# Patient Record
Sex: FEMALE | Race: WHITE | NOT HISPANIC OR LATINO | Employment: PART TIME | URBAN - METROPOLITAN AREA
[De-identification: names, ages, dates, MRNs, and addresses within clinical notes are randomized per-mention and may not be internally consistent; named-entity substitution may affect disease eponyms.]

---

## 2018-07-03 ENCOUNTER — HOSPITAL ENCOUNTER (INPATIENT)
Facility: HOSPITAL | Age: 53
LOS: 3 days | Discharge: HOME HEALTH | DRG: 872 | End: 2018-07-06
Attending: HOSPITALIST | Admitting: HOSPITALIST
Payer: COMMERCIAL

## 2018-07-03 DIAGNOSIS — E03.9 HYPOTHYROIDISM, ACQUIRED: ICD-10-CM

## 2018-07-03 DIAGNOSIS — R78.81 BACTEREMIA DUE TO GRAM-NEGATIVE BACTERIA: ICD-10-CM

## 2018-07-03 DIAGNOSIS — N20.0 NEPHROLITHIASIS: ICD-10-CM

## 2018-07-03 DIAGNOSIS — R31.9 URINARY TRACT INFECTION WITH HEMATURIA, SITE UNSPECIFIED: ICD-10-CM

## 2018-07-03 DIAGNOSIS — N13.4 HYDROURETER ON LEFT: ICD-10-CM

## 2018-07-03 DIAGNOSIS — R53.83 FATIGUE: ICD-10-CM

## 2018-07-03 DIAGNOSIS — N39.0 URINARY TRACT INFECTION WITH HEMATURIA, SITE UNSPECIFIED: ICD-10-CM

## 2018-07-03 DIAGNOSIS — N13.2 HYDRONEPHROSIS WITH URINARY OBSTRUCTION DUE TO URETERAL CALCULUS: ICD-10-CM

## 2018-07-03 DIAGNOSIS — N10 PYELONEPHRITIS, ACUTE: ICD-10-CM

## 2018-07-03 DIAGNOSIS — A41.51 SEPSIS DUE TO ESCHERICHIA COLI: Primary | ICD-10-CM

## 2018-07-03 PROBLEM — A41.9 SEPSIS: Status: ACTIVE | Noted: 2018-07-03

## 2018-07-03 LAB
ALBUMIN SERPL BCP-MCNC: 3.5 G/DL
ALP SERPL-CCNC: 93 U/L
ALT SERPL W/O P-5'-P-CCNC: 26 U/L
ANION GAP SERPL CALC-SCNC: 9 MMOL/L
AST SERPL-CCNC: 25 U/L
BACTERIA #/AREA URNS HPF: ABNORMAL /HPF
BASOPHILS # BLD AUTO: 0.03 K/UL
BASOPHILS NFR BLD: 0.3 %
BILIRUB SERPL-MCNC: 1 MG/DL
BILIRUB UR QL STRIP: NEGATIVE
BUN SERPL-MCNC: 13 MG/DL
CALCIUM SERPL-MCNC: 9.8 MG/DL
CHLORIDE SERPL-SCNC: 106 MMOL/L
CLARITY UR: ABNORMAL
CO2 SERPL-SCNC: 25 MMOL/L
COLOR UR: ABNORMAL
CREAT SERPL-MCNC: 1.1 MG/DL
DIFFERENTIAL METHOD: ABNORMAL
EOSINOPHIL # BLD AUTO: 0 K/UL
EOSINOPHIL NFR BLD: 0.3 %
ERYTHROCYTE [DISTWIDTH] IN BLOOD BY AUTOMATED COUNT: 12.1 %
EST. GFR  (AFRICAN AMERICAN): >60 ML/MIN/1.73 M^2
EST. GFR  (NON AFRICAN AMERICAN): 57 ML/MIN/1.73 M^2
GLUCOSE SERPL-MCNC: 112 MG/DL
GLUCOSE UR QL STRIP: NEGATIVE
HCT VFR BLD AUTO: 37.5 %
HGB BLD-MCNC: 12.7 G/DL
HGB UR QL STRIP: ABNORMAL
HYALINE CASTS #/AREA URNS LPF: 0 /LPF
KETONES UR QL STRIP: NEGATIVE
LACTATE SERPL-SCNC: 1.9 MMOL/L
LEUKOCYTE ESTERASE UR QL STRIP: ABNORMAL
LYMPHOCYTES # BLD AUTO: 0.7 K/UL
LYMPHOCYTES NFR BLD: 6.7 %
MCH RBC QN AUTO: 29.7 PG
MCHC RBC AUTO-ENTMCNC: 33.9 G/DL
MCV RBC AUTO: 88 FL
MICROSCOPIC COMMENT: ABNORMAL
MONOCYTES # BLD AUTO: 1.2 K/UL
MONOCYTES NFR BLD: 10.5 %
NEUTROPHILS # BLD AUTO: 9.1 K/UL
NEUTROPHILS NFR BLD: 82 %
NITRITE UR QL STRIP: POSITIVE
PH UR STRIP: 5 [PH] (ref 5–8)
PLATELET # BLD AUTO: 288 K/UL
PMV BLD AUTO: 9.6 FL
POTASSIUM SERPL-SCNC: 4 MMOL/L
PROT SERPL-MCNC: 7.3 G/DL
PROT UR QL STRIP: ABNORMAL
RBC # BLD AUTO: 4.28 M/UL
RBC #/AREA URNS HPF: 2 /HPF (ref 0–4)
SODIUM SERPL-SCNC: 140 MMOL/L
SP GR UR STRIP: 1.02 (ref 1–1.03)
URN SPEC COLLECT METH UR: ABNORMAL
UROBILINOGEN UR STRIP-ACNC: ABNORMAL EU/DL
WBC # BLD AUTO: 11.08 K/UL
WBC #/AREA URNS HPF: 60 /HPF (ref 0–5)

## 2018-07-03 PROCEDURE — 63600175 PHARM REV CODE 636 W HCPCS: Performed by: PHYSICIAN ASSISTANT

## 2018-07-03 PROCEDURE — 25000003 PHARM REV CODE 250: Performed by: PHYSICIAN ASSISTANT

## 2018-07-03 PROCEDURE — 87077 CULTURE AEROBIC IDENTIFY: CPT

## 2018-07-03 PROCEDURE — 96365 THER/PROPH/DIAG IV INF INIT: CPT

## 2018-07-03 PROCEDURE — 87086 URINE CULTURE/COLONY COUNT: CPT

## 2018-07-03 PROCEDURE — 11000001 HC ACUTE MED/SURG PRIVATE ROOM

## 2018-07-03 PROCEDURE — 87088 URINE BACTERIA CULTURE: CPT

## 2018-07-03 PROCEDURE — 25000003 PHARM REV CODE 250: Performed by: NURSE PRACTITIONER

## 2018-07-03 PROCEDURE — 83605 ASSAY OF LACTIC ACID: CPT

## 2018-07-03 PROCEDURE — 87186 SC STD MICRODIL/AGAR DIL: CPT | Mod: 59

## 2018-07-03 PROCEDURE — 87040 BLOOD CULTURE FOR BACTERIA: CPT | Mod: 59

## 2018-07-03 PROCEDURE — 85025 COMPLETE CBC W/AUTO DIFF WBC: CPT

## 2018-07-03 PROCEDURE — 80053 COMPREHEN METABOLIC PANEL: CPT

## 2018-07-03 PROCEDURE — 99285 EMERGENCY DEPT VISIT HI MDM: CPT | Mod: 25

## 2018-07-03 PROCEDURE — 63600175 PHARM REV CODE 636 W HCPCS: Performed by: NURSE PRACTITIONER

## 2018-07-03 PROCEDURE — 81000 URINALYSIS NONAUTO W/SCOPE: CPT

## 2018-07-03 PROCEDURE — 96375 TX/PRO/DX INJ NEW DRUG ADDON: CPT

## 2018-07-03 RX ORDER — FAMOTIDINE 10 MG/ML
20 INJECTION INTRAVENOUS EVERY 12 HOURS
Status: DISCONTINUED | OUTPATIENT
Start: 2018-07-03 | End: 2018-07-03

## 2018-07-03 RX ORDER — SODIUM CHLORIDE 9 MG/ML
INJECTION, SOLUTION INTRAVENOUS CONTINUOUS
Status: DISCONTINUED | OUTPATIENT
Start: 2018-07-03 | End: 2018-07-06 | Stop reason: HOSPADM

## 2018-07-03 RX ORDER — KETOROLAC TROMETHAMINE 30 MG/ML
15 INJECTION, SOLUTION INTRAMUSCULAR; INTRAVENOUS
Status: COMPLETED | OUTPATIENT
Start: 2018-07-03 | End: 2018-07-03

## 2018-07-03 RX ORDER — MORPHINE SULFATE 4 MG/ML
2 INJECTION, SOLUTION INTRAMUSCULAR; INTRAVENOUS EVERY 4 HOURS PRN
Status: DISCONTINUED | OUTPATIENT
Start: 2018-07-03 | End: 2018-07-06 | Stop reason: HOSPADM

## 2018-07-03 RX ORDER — ONDANSETRON 2 MG/ML
4 INJECTION INTRAMUSCULAR; INTRAVENOUS EVERY 8 HOURS PRN
Status: DISCONTINUED | OUTPATIENT
Start: 2018-07-03 | End: 2018-07-06 | Stop reason: HOSPADM

## 2018-07-03 RX ORDER — MORPHINE SULFATE 4 MG/ML
4 INJECTION, SOLUTION INTRAMUSCULAR; INTRAVENOUS EVERY 4 HOURS PRN
Status: DISCONTINUED | OUTPATIENT
Start: 2018-07-03 | End: 2018-07-06 | Stop reason: HOSPADM

## 2018-07-03 RX ORDER — PANTOPRAZOLE SODIUM 40 MG/1
40 TABLET, DELAYED RELEASE ORAL DAILY
Status: DISCONTINUED | OUTPATIENT
Start: 2018-07-04 | End: 2018-07-06 | Stop reason: HOSPADM

## 2018-07-03 RX ORDER — LEVOTHYROXINE SODIUM 150 UG/1
150 TABLET ORAL
Status: DISCONTINUED | OUTPATIENT
Start: 2018-07-04 | End: 2018-07-06 | Stop reason: HOSPADM

## 2018-07-03 RX ORDER — ONDANSETRON 2 MG/ML
4 INJECTION INTRAMUSCULAR; INTRAVENOUS
Status: COMPLETED | OUTPATIENT
Start: 2018-07-03 | End: 2018-07-03

## 2018-07-03 RX ORDER — ACETAMINOPHEN 325 MG/1
650 TABLET ORAL
Status: COMPLETED | OUTPATIENT
Start: 2018-07-03 | End: 2018-07-03

## 2018-07-03 RX ORDER — ERGOCALCIFEROL 1.25 MG/1
50000 CAPSULE ORAL WEEKLY
Status: DISCONTINUED | OUTPATIENT
Start: 2018-07-03 | End: 2018-07-06 | Stop reason: HOSPADM

## 2018-07-03 RX ORDER — ACETAMINOPHEN 325 MG/1
650 TABLET ORAL EVERY 8 HOURS PRN
Status: DISCONTINUED | OUTPATIENT
Start: 2018-07-03 | End: 2018-07-06 | Stop reason: HOSPADM

## 2018-07-03 RX ADMIN — ONDANSETRON 4 MG: 2 INJECTION INTRAMUSCULAR; INTRAVENOUS at 09:07

## 2018-07-03 RX ADMIN — ONDANSETRON 4 MG: 2 INJECTION INTRAMUSCULAR; INTRAVENOUS at 12:07

## 2018-07-03 RX ADMIN — CEFTRIAXONE 1 G: 1 INJECTION, SOLUTION INTRAVENOUS at 12:07

## 2018-07-03 RX ADMIN — ACETAMINOPHEN 650 MG: 325 TABLET, FILM COATED ORAL at 12:07

## 2018-07-03 RX ADMIN — MORPHINE SULFATE 4 MG: 4 INJECTION INTRAVENOUS at 09:07

## 2018-07-03 RX ADMIN — ACETAMINOPHEN 650 MG: 325 TABLET, FILM COATED ORAL at 07:07

## 2018-07-03 RX ADMIN — SODIUM CHLORIDE 1000 ML: 0.9 INJECTION, SOLUTION INTRAVENOUS at 11:07

## 2018-07-03 RX ADMIN — SODIUM CHLORIDE: 0.9 INJECTION, SOLUTION INTRAVENOUS at 05:07

## 2018-07-03 RX ADMIN — ERGOCALCIFEROL 50000 UNITS: 1.25 CAPSULE ORAL at 05:07

## 2018-07-03 RX ADMIN — KETOROLAC TROMETHAMINE 15 MG: 30 INJECTION, SOLUTION INTRAMUSCULAR at 12:07

## 2018-07-03 NOTE — SUBJECTIVE & OBJECTIVE
Past Medical History:   Diagnosis Date    Fibroids     uterine    Hernia, ventral     s/p cholectectomy    Hypothyroidism, acquired     Migraines     Weight gain        Past Surgical History:   Procedure Laterality Date    CARPAL TUNNEL RELEASE  may  2014    CHOLECYSTECTOMY      laparoscopic    meniscal surgery  May 2014    left knee    rotator cuff surgery      TUBAL LIGATION      bitateral       Review of patient's allergies indicates:   Allergen Reactions    Percocet  [oxycodone-acetaminophen]      Other reaction(s): Hives       No current facility-administered medications on file prior to encounter.      Current Outpatient Prescriptions on File Prior to Encounter   Medication Sig    ergocalciferol (ERGOCALCIFEROL) 50,000 unit Cap Take 1 capsule (50,000 Units total) by mouth once a week.    esomeprazole (NEXIUM) 40 MG capsule Take 1 capsule (40 mg total) by mouth before breakfast.    SYNTHROID 150 mcg tablet Take 1 tablet (150 mcg total) by mouth before breakfast.     Family History     Problem Relation (Age of Onset)    Breast cancer     Diabetes Mellitus Mother    Heart disease Father    Parkinsonism Father    Thyroid cancer Father        Social History Main Topics    Smoking status: Never Smoker    Smokeless tobacco: Never Used    Alcohol use No    Drug use: No    Sexual activity: Yes     Partners: Male     Review of Systems   Constitutional: Positive for appetite change, chills, fatigue and fever.   HENT: Negative for congestion and dental problem.    Eyes: Negative for discharge and itching.   Respiratory: Negative for apnea and chest tightness.    Cardiovascular: Negative for chest pain and leg swelling.   Gastrointestinal: Negative for abdominal distention and abdominal pain.   Endocrine: Negative for cold intolerance and heat intolerance.   Genitourinary: Positive for dysuria, flank pain and frequency. Negative for difficulty urinating and dyspareunia.   Musculoskeletal: Positive for  myalgias. Negative for arthralgias and back pain.   Skin: Negative for color change and pallor.   Allergic/Immunologic: Negative for environmental allergies and food allergies.   Neurological: Negative for dizziness and facial asymmetry.   Hematological: Negative for adenopathy. Does not bruise/bleed easily.   Psychiatric/Behavioral: Negative for agitation and behavioral problems.     Objective:     Vital Signs (Most Recent):  Temp: (!) 101.2 °F (38.4 °C) (07/03/18 1251)  Pulse: 93 (07/03/18 1251)  Resp: 18 (07/03/18 1105)  BP: (!) 111/58 (07/03/18 1251)  SpO2: (!) 93 % (07/03/18 1253) Vital Signs (24h Range):  Temp:  [101.2 °F (38.4 °C)-103.1 °F (39.5 °C)] 101.2 °F (38.4 °C)  Pulse:  [] 93  Resp:  [18] 18  SpO2:  [81 %-97 %] 93 %  BP: (111-120)/(57-58) 111/58     Weight: 77.1 kg (170 lb)  Body mass index is 28.29 kg/m².    Physical Exam   Constitutional: No distress.   HENT:   Head: Atraumatic.   Eyes: EOM are normal. Pupils are equal, round, and reactive to light.   Neck: Normal range of motion. Neck supple.   Cardiovascular: Regular rhythm.    tachycardia   Pulmonary/Chest: Effort normal and breath sounds normal.   Abdominal: Soft. Bowel sounds are normal. She exhibits no distension. There is no tenderness.   Musculoskeletal: Normal range of motion. She exhibits tenderness.   Left CVA tenderness    Skin: Skin is warm and dry.   Psychiatric: She has a normal mood and affect. Her behavior is normal.         CRANIAL NERVES     CN III, IV, VI   Pupils are equal, round, and reactive to light.  Extraocular motions are normal.        Significant Labs:   BMP:   Recent Labs  Lab 07/03/18  1145   *      K 4.0      CO2 25   BUN 13   CREATININE 1.1   CALCIUM 9.8     CBC:   Recent Labs  Lab 07/03/18  1145   WBC 11.08   HGB 12.7   HCT 37.5          Significant Imaging: reviewed.

## 2018-07-03 NOTE — H&P
"[facility] Hospital Medicine  History & Physical    Patient Name: Mirta Perry  MRN: [id number]  Admission Date: 7/3/2018  Attending Physician: Swapna Mena MD   Primary Care Provider: Primary Doctor No         Patient information was obtained from patient and ER records.     Subjective:     Principal Problem:Pyelonephritis, acute    Chief Complaint:   Chief Complaint   Patient presents with    Flank Pain     left flank pain started x 5 days ago getting worse.  + diarrhea and fever.  denies vomiting.  states had kidney stone and infection x 30 years ago.         HPI: 54 y/o female with uterine fibroids, hx of kidney stones, hypothyroidism,and hysterectomy presents to the ED c/o x5 day hx of L sided flank pain that now radiates to LLQ abdomen. The pain is severe (9/10) and worse with palpation and urination. She reports a "pulsating" pain with urination. The patient states "it feels like a kidney stone that I got 30 yrs ago." The patient also reports subjective fever, chills, nausea, diarrhea, and dysuria. The patient attempted AZO with slight relief. The patient reports hematuria x5 days ago that resolved after taking AZO. The patient denies emesis, vaginal d/c, or vaginal bleeding.patioent is septic with fever 103,tachycadia 136,source acute pyelonephritis,CT show 3 mm urethral obstruction with hydronephrosis and hydroureter,she has left CVA tenderness as well,cultures has been done and she has been started on IV Abx and IVF.    Past Medical History:   Diagnosis Date    Fibroids     uterine    Hernia, ventral     s/p cholectectomy    Hypothyroidism, acquired     Migraines     Weight gain        Past Surgical History:   Procedure Laterality Date    CARPAL TUNNEL RELEASE  may  2014    CHOLECYSTECTOMY      laparoscopic    meniscal surgery  May 2014    left knee    rotator cuff surgery      TUBAL LIGATION      bitateral       Review of patient's allergies indicates:   Allergen " Reactions    Percocet  [oxycodone-acetaminophen]      Other reaction(s): Hives       No current facility-administered medications on file prior to encounter.      Current Outpatient Prescriptions on File Prior to Encounter   Medication Sig    ergocalciferol (ERGOCALCIFEROL) 50,000 unit Cap Take 1 capsule (50,000 Units total) by mouth once a week.    esomeprazole (NEXIUM) 40 MG capsule Take 1 capsule (40 mg total) by mouth before breakfast.    SYNTHROID 150 mcg tablet Take 1 tablet (150 mcg total) by mouth before breakfast.     Family History     Problem Relation (Age of Onset)    Breast cancer     Diabetes Mellitus Mother    Heart disease Father    Parkinsonism Father    Thyroid cancer Father        Social History Main Topics    Smoking status: Never Smoker    Smokeless tobacco: Never Used    Alcohol use No    Drug use: No    Sexual activity: Yes     Partners: Male     Review of Systems   Constitutional: Positive for appetite change, chills, fatigue and fever.   HENT: Negative for congestion and dental problem.    Eyes: Negative for discharge and itching.   Respiratory: Negative for apnea and chest tightness.    Cardiovascular: Negative for chest pain and leg swelling.   Gastrointestinal: Negative for abdominal distention and abdominal pain.   Endocrine: Negative for cold intolerance and heat intolerance.   Genitourinary: Positive for dysuria, flank pain and frequency. Negative for difficulty urinating and dyspareunia.   Musculoskeletal: Positive for myalgias. Negative for arthralgias and back pain.   Skin: Negative for color change and pallor.   Allergic/Immunologic: Negative for environmental allergies and food allergies.   Neurological: Negative for dizziness and facial asymmetry.   Hematological: Negative for adenopathy. Does not bruise/bleed easily.   Psychiatric/Behavioral: Negative for agitation and behavioral problems.     Objective:     Vital Signs (Most Recent):  Temp: (!) 101.2 °F (38.4 °C)  (07/03/18 1251)  Pulse: 93 (07/03/18 1251)  Resp: 18 (07/03/18 1105)  BP: (!) 111/58 (07/03/18 1251)  SpO2: (!) 93 % (07/03/18 1253) Vital Signs (24h Range):  Temp:  [101.2 °F (38.4 °C)-103.1 °F (39.5 °C)] 101.2 °F (38.4 °C)  Pulse:  [] 93  Resp:  [18] 18  SpO2:  [81 %-97 %] 93 %  BP: (111-120)/(57-58) 111/58     Weight: 77.1 kg (170 lb)  Body mass index is 28.29 kg/m².    Physical Exam   Constitutional: No distress.   HENT:   Head: Atraumatic.   Eyes: EOM are normal. Pupils are equal, round, and reactive to light.   Neck: Normal range of motion. Neck supple.   Cardiovascular: Regular rhythm.    tachycardia   Pulmonary/Chest: Effort normal and breath sounds normal.   Abdominal: Soft. Bowel sounds are normal. She exhibits no distension. There is no tenderness.   Musculoskeletal: Normal range of motion. She exhibits tenderness.   Left CVA tenderness    Skin: Skin is warm and dry.   Psychiatric: She has a normal mood and affect. Her behavior is normal.         CRANIAL NERVES     CN III, IV, VI   Pupils are equal, round, and reactive to light.  Extraocular motions are normal.        Significant Labs:   BMP:   Recent Labs  Lab 07/03/18  1145   *      K 4.0      CO2 25   BUN 13   CREATININE 1.1   CALCIUM 9.8     CBC:   Recent Labs  Lab 07/03/18  1145   WBC 11.08   HGB 12.7   HCT 37.5          Significant Imaging: reviewed.    Assessment/Plan:     * Pyelonephritis, acute      Per clinical presentation with dysuria,CVA tenderness,positive  UA,will continue with IVF,IV Abx,follow cultures.        Hydroureter on left    Per CT ,Urology has been consulted.          Hydronephrosis with urinary obstruction due to ureteral calculus    Per CT rep[ort and clonical  Presentation,Uroogy has been consulted,keep NPO past MN.          Sepsis    With fever 103,tachycadia,source acute pyelonephritis,on IVF and  IV Abx,        Hypothyroidism, acquired    On synthroid.            VTE Risk Mitigation     None              Swapna Mena MD  Department of Hospital Medicine   [facility]

## 2018-07-03 NOTE — ASSESSMENT & PLAN NOTE
Per clinical presentation with dysuria,CVA tenderness,positive  UA,will continue with IVF,IV Abx,follow cultures.

## 2018-07-03 NOTE — ED PROVIDER NOTES
"Encounter Date: 7/3/2018    SCRIBE #1 NOTE: I, JacquelynIrisVanessa Croninang, am scribing for, and in the presence of,  Chang Simmons DNP. I have scribed the following portions of the note - Other sections scribed: HPI, ROS.       History     Chief Complaint   Patient presents with    Flank Pain     left flank pain started x 5 days ago getting worse.  + diarrhea and fever.  denies vomiting.  states had kidney stone and infection x 30 years ago.      CC: Flank Pain    52 y/o female with uterine fibroids, hx of kidney stones, and hysterectomy presents to the ED c/o x5 day hx of L sided flank pain that now radiates to LLQ abdomen. The pain is severe (9/10) and worse with palpation and urination. She reports a "pulsating" pain with urination. The patient states "it feels like a kidney stone that I got 30 yrs ago." The patient also reports subjective fever, chills, nausea, diarrhea, and dysuria. The patient attempted AZO with slight relief. The patient reports hematuria x5 days ago that resolved after taking AZO. The patient denies emesis, vaginal d/c, or vaginal bleeding. No other symptoms reported.       The history is provided by the patient. No  was used.     Review of patient's allergies indicates:   Allergen Reactions    Percocet  [oxycodone-acetaminophen]      Other reaction(s): Hives     Past Medical History:   Diagnosis Date    Fibroids     uterine    Hernia, ventral     s/p cholectectomy    Hypothyroidism, acquired     Migraines     Weight gain      Past Surgical History:   Procedure Laterality Date    CARPAL TUNNEL RELEASE  may  2014    CHOLECYSTECTOMY      laparoscopic    meniscal surgery  May 2014    left knee    rotator cuff surgery      TUBAL LIGATION      bitateral     Family History   Problem Relation Age of Onset    Diabetes Mellitus Mother     Thyroid cancer Father         daughter     Parkinsonism Father     Heart disease Father     Breast cancer Unknown         both sides "     Social History   Substance Use Topics    Smoking status: Never Smoker    Smokeless tobacco: Never Used    Alcohol use No     Review of Systems   Constitutional: Positive for chills and fever (subjective).   HENT: Negative for congestion, ear pain, rhinorrhea and sore throat.    Eyes: Negative for redness.   Respiratory: Negative for cough and shortness of breath.    Cardiovascular: Negative for chest pain.   Gastrointestinal: Positive for abdominal pain, diarrhea and nausea. Negative for vomiting.   Genitourinary: Positive for dysuria, flank pain (L sided radiates to LLQ abdomen) and hematuria (resolved). Negative for decreased urine volume, difficulty urinating, frequency, urgency, vaginal bleeding and vaginal discharge.   Musculoskeletal: Negative for back pain and neck pain.   Skin: Negative for rash.   Neurological: Negative for headaches.       Physical Exam     Initial Vitals [07/03/18 1105]   BP Pulse Resp Temp SpO2   (!) 120/57 (!) 127 18 (!) 103.1 °F (39.5 °C) 97 %      MAP       --         Physical Exam    Nursing note and vitals reviewed.  Constitutional: She appears well-developed and well-nourished.   HENT:   Head: Normocephalic and atraumatic.   Right Ear: External ear normal.   Left Ear: External ear normal.   Nose: Nose normal.   Eyes: Conjunctivae and EOM are normal. Pupils are equal, round, and reactive to light. Right eye exhibits no discharge. Left eye exhibits no discharge.   Neck: Normal range of motion.   Abdominal: Soft. Normal appearance and bowel sounds are normal. She exhibits no distension. There is no tenderness. There is CVA tenderness (left).   Musculoskeletal: Normal range of motion.   Neurological: She is alert and oriented to person, place, and time.   Skin: Skin is dry. Capillary refill takes less than 2 seconds.         ED Course   Procedures  Labs Reviewed   CBC W/ AUTO DIFFERENTIAL - Abnormal; Notable for the following:        Result Value    Gran # (ANC) 9.1 (*)      Lymph # 0.7 (*)     Mono # 1.2 (*)     Gran% 82.0 (*)     Lymph% 6.7 (*)     All other components within normal limits   COMPREHENSIVE METABOLIC PANEL - Abnormal; Notable for the following:     Glucose 112 (*)     eGFR if non  57 (*)     All other components within normal limits   URINALYSIS, REFLEX TO URINE CULTURE - Abnormal; Notable for the following:     Appearance, UA Cloudy (*)     Protein, UA 2+ (*)     Occult Blood UA 2+ (*)     Nitrite, UA Positive (*)     Urobilinogen, UA 2.0-3.0 (*)     Leukocytes, UA 3+ (*)     All other components within normal limits    Narrative:     Preferred Collection Type->Urine, Clean Catch   URINALYSIS MICROSCOPIC - Abnormal; Notable for the following:     WBC, UA 60 (*)     Bacteria, UA Moderate (*)     All other components within normal limits    Narrative:     Preferred Collection Type->Urine, Clean Catch   CULTURE, BLOOD   CULTURE, BLOOD   CULTURE, URINE   LACTIC ACID, PLASMA          Imaging Results          CT Renal Stone Study ABD Pelvis WO (Final result)  Result time 07/03/18 12:14:25    Final result by Macho Linares MD (07/03/18 12:14:25)                 Impression:      Findings suggestive of a 3 mm distal left ureteral calculus resulting in mild left-sided hydronephrosis and hydroureter.    Surgical changes.      Electronically signed by: Macho Linares MD  Date:    07/03/2018  Time:    12:14             Narrative:    EXAMINATION:  CT RENAL STONE STUDY ABD PELVIS WO    CLINICAL HISTORY:  Flank pain, stone disease suspected;    TECHNIQUE:  Low dose axial images, sagittal and coronal reformations were obtained from the lung bases to the pubic symphysis.  Contrast was not administered.    COMPARISON:  09/15/2009.    FINDINGS:  There are bilateral breast implants.  The lung bases are clear.  The bones appear intact without evidence for acute fracture or bone destruction.    The liver appears normal in size and is homogeneous in density with no focal  abnormalities of the liver identified.  The gallbladder is absent.  There are surgical changes involving the stomach.  The stomach otherwise appears unremarkable.  The spleen and pancreas both appear grossly unremarkable.  The adrenal glands are not enlarged.  The right kidney is normal in size, position, and contour and there is no evidence for abnormal renal masses or hydronephrosis on the right.  On the left, there is mild stranding of the perinephric fat.  There appears to be mild left-sided hydronephrosis and proximal hydroureter present.  The left ureter is difficult to follow throughout its course.  There is a 3 mm calcification within the pelvis which is not appreciated on the prior examination.  While this could represent a phlebolith, a distal left ureteral calculus is suspected.  Correlation with patient's clinical and physical findings is recommended.  There is evidence of prior abdominal hernia repair.  There are no dilated loops of bowel evident.  The urinary bladder is decompressed.  The uterus is absent.  No abnormal pelvic masses are identified.  There is no evidence for pelvic or inguinal lymphadenopathy.                                       APC / Resident Notes:   MEDICAL DECISION MAKING    INITIAL ASSESSMENT:  Left flank pain in a 53-year-old female for the last 5 days that radiates to the left groin and is worsening and constant.  She reports ibuprofen and Excedrin have been ineffective.  There is associated red colored urine.  Patient reports is made worse by eating.  She reports a current severity pain of 9/10 and associated fever with a T-max of 103.1°. While she was in nad, she was hemodynamically unstable with a hr of 137.  CVA was tender on left side.    ED COURSE: Following thorough history and physical, labs were drawn and pt submitted urine sample, underwent Ct scan of the abd pel stone protocol.    LABS AND RADIOLOGY: see below for lab and rad interpretations    MEDICATIONS  ADMINISTERED: zofran 4mg ivp, toradol 15mg ivp, NS 1L IV, tylenol 650mg po, rocephin 1g IVPB    DIFFERENTIAL DIAGNOSES:uti, pyelo, nephrolithiasis-obstructive and infected    ACUTE DIAGNOSIS (ES):nephrolithiasis, uti, sepsis    Pt will be admitted to the floor for IV abx and IV fluids.    PHYSICIAN INTERACTION:Care of the patient discussed with Dr. Schafer who agreed with the assessment and plan.          Scribe Attestation:   Scribe #1: I performed the above scribed service and the documentation accurately describes the services I performed. I attest to the accuracy of the note.    Attending Attestation:           Physician Attestation for Scribe:  Physician Attestation Statement for Scribe #1: I, Chang Simmons DNP, reviewed documentation, as scribed by Tosha Cardona in my presence, and it is both accurate and complete.                 ED Course as of Jul 03 1310   Tue Jul 03, 2018   1219 CBC: leukocyte count was normal, the H&H was normal. The platelet count was normal.       [VC]   1220 Findings suggestive of a 3 mm distal left ureteral calculus resulting in mild left-sided hydronephrosis and hydroureter.    Surgical changes. CT Renal Stone Study ABD Pelvis WO [VC]   1225 Lactate, Ramy: 1.9 [VC]   1236 The chemistry was negative for hypo-or hyper natremia, kalemia, chloridemia, or other electrolyte abnormalities; BUN and creatinine were within normal limits indicating normal kidney function, ALT and AST were within normal limits indicating normal liver function, there was no transaminitis.      [VC]   1256 BP: (!) 111/58 [VC]   1309 UA demonstrates infection with presence of nitrite and 60wbc/hpf.  [VC]   1310 Blood cultures and urine culture will be pending at time of disposition.  [VC]      ED Course User Index  [VC] Chang Santoyo DNP     Clinical Impression:   The primary encounter diagnosis was Nephrolithiasis. A diagnosis of Urinary tract infection with hematuria, site unspecified was also  pertinent to this visit.      Disposition:   Disposition: Admitted  Condition: Stable                        Chang Santoyo, MOIRA  07/03/18 1310

## 2018-07-03 NOTE — ED TRIAGE NOTES
Patient came in PER personal transport with c/o LEFT sided flank pain that started 5 days ago. Patient states that she is having diarrhea and fever, patient denies any vomiting. Patient has a history of kidney stone.

## 2018-07-03 NOTE — PROGRESS NOTES
Pt arrived to unit from ER with family @ side. Oriented to room. Bed in low position. Call light in reach, HOB elevated. SR up x2. AAox3. No distress noted. Will continue to monitor.

## 2018-07-03 NOTE — HPI
"52 y/o female with uterine fibroids, hx of kidney stones, hypothyroidism,and hysterectomy presents to the ED c/o x5 day hx of L sided flank pain that now radiates to LLQ abdomen. The pain is severe (9/10) and worse with palpation and urination. She reports a "pulsating" pain with urination. The patient states "it feels like a kidney stone that I got 30 yrs ago." The patient also reports subjective fever, chills, nausea, diarrhea, and dysuria. The patient attempted AZO with slight relief. The patient reports hematuria x5 days ago that resolved after taking AZO. The patient denies emesis, vaginal d/c, or vaginal bleeding.patioent is septic with fever 103,tachycadia 136,source acute pyelonephritis,CT show 3 mm urethral obstruction with hydronephrosis and hydroureter,she has left CVA tenderness as well,cultures has been done and she has been started on IV Abx and IVF.  "

## 2018-07-03 NOTE — PROGRESS NOTES
Pt sitting up in bed eating dinner with family @ side. No distress noted. Will continue to monitor. Pt request that TEDS/SCDS be put on later.

## 2018-07-04 PROBLEM — R78.81 BACTEREMIA DUE TO GRAM-NEGATIVE BACTERIA: Status: ACTIVE | Noted: 2018-07-04

## 2018-07-04 LAB
ALBUMIN SERPL BCP-MCNC: 2.8 G/DL
ALP SERPL-CCNC: 100 U/L
ALT SERPL W/O P-5'-P-CCNC: 35 U/L
ANION GAP SERPL CALC-SCNC: 7 MMOL/L
AST SERPL-CCNC: 41 U/L
BASOPHILS # BLD AUTO: 0.01 K/UL
BASOPHILS NFR BLD: 0.2 %
BILIRUB SERPL-MCNC: 0.7 MG/DL
BUN SERPL-MCNC: 12 MG/DL
CALCIUM SERPL-MCNC: 8.8 MG/DL
CHLORIDE SERPL-SCNC: 110 MMOL/L
CO2 SERPL-SCNC: 24 MMOL/L
CREAT SERPL-MCNC: 1 MG/DL
DIFFERENTIAL METHOD: ABNORMAL
EOSINOPHIL # BLD AUTO: 0 K/UL
EOSINOPHIL NFR BLD: 0.2 %
ERYTHROCYTE [DISTWIDTH] IN BLOOD BY AUTOMATED COUNT: 12.1 %
EST. GFR  (AFRICAN AMERICAN): >60 ML/MIN/1.73 M^2
EST. GFR  (NON AFRICAN AMERICAN): >60 ML/MIN/1.73 M^2
GLUCOSE SERPL-MCNC: 102 MG/DL
HCT VFR BLD AUTO: 33.3 %
HGB BLD-MCNC: 10.8 G/DL
LYMPHOCYTES # BLD AUTO: 0.7 K/UL
LYMPHOCYTES NFR BLD: 9.8 %
MCH RBC QN AUTO: 28.9 PG
MCHC RBC AUTO-ENTMCNC: 32.4 G/DL
MCV RBC AUTO: 89 FL
MONOCYTES # BLD AUTO: 0.7 K/UL
MONOCYTES NFR BLD: 11.2 %
NEUTROPHILS # BLD AUTO: 5.2 K/UL
NEUTROPHILS NFR BLD: 78.3 %
PLATELET # BLD AUTO: 269 K/UL
PMV BLD AUTO: 10 FL
POTASSIUM SERPL-SCNC: 3.9 MMOL/L
PROT SERPL-MCNC: 6.1 G/DL
RBC # BLD AUTO: 3.74 M/UL
SODIUM SERPL-SCNC: 141 MMOL/L
WBC # BLD AUTO: 6.62 K/UL

## 2018-07-04 PROCEDURE — 87040 BLOOD CULTURE FOR BACTERIA: CPT | Mod: 59

## 2018-07-04 PROCEDURE — 99252 IP/OBS CONSLTJ NEW/EST SF 35: CPT | Mod: ,,, | Performed by: UROLOGY

## 2018-07-04 PROCEDURE — 80053 COMPREHEN METABOLIC PANEL: CPT

## 2018-07-04 PROCEDURE — 63600175 PHARM REV CODE 636 W HCPCS: Performed by: NURSE PRACTITIONER

## 2018-07-04 PROCEDURE — 36415 COLL VENOUS BLD VENIPUNCTURE: CPT

## 2018-07-04 PROCEDURE — 25000003 PHARM REV CODE 250: Performed by: NURSE PRACTITIONER

## 2018-07-04 PROCEDURE — 25000003 PHARM REV CODE 250: Performed by: HOSPITALIST

## 2018-07-04 PROCEDURE — 85025 COMPLETE CBC W/AUTO DIFF WBC: CPT

## 2018-07-04 PROCEDURE — 11000001 HC ACUTE MED/SURG PRIVATE ROOM

## 2018-07-04 PROCEDURE — 63600175 PHARM REV CODE 636 W HCPCS: Performed by: HOSPITALIST

## 2018-07-04 RX ORDER — DIPHENHYDRAMINE HCL 25 MG
25 CAPSULE ORAL EVERY 6 HOURS PRN
Status: DISCONTINUED | OUTPATIENT
Start: 2018-07-04 | End: 2018-07-06 | Stop reason: HOSPADM

## 2018-07-04 RX ADMIN — LEVOTHYROXINE SODIUM 150 MCG: 150 TABLET ORAL at 07:07

## 2018-07-04 RX ADMIN — PANTOPRAZOLE SODIUM 40 MG: 40 TABLET, DELAYED RELEASE ORAL at 09:07

## 2018-07-04 RX ADMIN — ACETAMINOPHEN 650 MG: 325 TABLET, FILM COATED ORAL at 03:07

## 2018-07-04 RX ADMIN — SODIUM CHLORIDE 1000 ML: 0.9 INJECTION, SOLUTION INTRAVENOUS at 01:07

## 2018-07-04 RX ADMIN — DIPHENHYDRAMINE HYDROCHLORIDE 25 MG: 25 CAPSULE ORAL at 11:07

## 2018-07-04 RX ADMIN — CEFTRIAXONE 1 G: 1 INJECTION, SOLUTION INTRAVENOUS at 09:07

## 2018-07-04 RX ADMIN — SODIUM CHLORIDE: 0.9 INJECTION, SOLUTION INTRAVENOUS at 11:07

## 2018-07-04 RX ADMIN — ONDANSETRON 4 MG: 2 INJECTION INTRAMUSCULAR; INTRAVENOUS at 03:07

## 2018-07-04 RX ADMIN — SODIUM CHLORIDE: 0.9 INJECTION, SOLUTION INTRAVENOUS at 09:07

## 2018-07-04 RX ADMIN — CEFTRIAXONE 1 G: 1 INJECTION, SOLUTION INTRAVENOUS at 08:07

## 2018-07-04 NOTE — ASSESSMENT & PLAN NOTE
With fever 103,tachycadia,source acute pyelonephritis,on IVF and  IV Abx,fever is improved,but still is better.

## 2018-07-04 NOTE — SUBJECTIVE & OBJECTIVE
Past Medical History:   Diagnosis Date    Acute pyelonephritis 07/03/2018    Fatigue     Fibroids     uterine    Hernia, ventral     s/p cholectectomy    Hypothyroidism, acquired     Migraines     Vitamin D deficiency     Weight gain        Past Surgical History:   Procedure Laterality Date    APPENDECTOMY Bilateral     tummy tuck    BREAST SURGERY Bilateral     augmentation    CARPAL TUNNEL RELEASE Right 05/2014    CHOLECYSTECTOMY      laparoscopic    gastric sleeve      meniscal surgery  May 2014    left knee    rotator cuff surgery Right     TUBAL LIGATION      bitateral       Review of patient's allergies indicates:   Allergen Reactions    Percocet  [oxycodone-acetaminophen]      Other reaction(s): Hives       No current facility-administered medications on file prior to encounter.      Current Outpatient Prescriptions on File Prior to Encounter   Medication Sig    ergocalciferol (ERGOCALCIFEROL) 50,000 unit Cap Take 1 capsule (50,000 Units total) by mouth once a week.    esomeprazole (NEXIUM) 40 MG capsule Take 1 capsule (40 mg total) by mouth before breakfast.    SYNTHROID 150 mcg tablet Take 1 tablet (150 mcg total) by mouth before breakfast.     Family History     Problem Relation (Age of Onset)    Breast cancer     Diabetes Mellitus Mother    Heart disease Father    Parkinsonism Father    Thyroid cancer Father        Social History Main Topics    Smoking status: Never Smoker    Smokeless tobacco: Never Used    Alcohol use No    Drug use: No    Sexual activity: Yes     Partners: Male     Review of Systems   Constitutional: Positive for appetite change, chills, fatigue and fever.   HENT: Negative for congestion and dental problem.    Eyes: Negative for discharge and itching.   Respiratory: Negative for apnea and chest tightness.    Cardiovascular: Negative for chest pain and leg swelling.   Gastrointestinal: Negative for abdominal distention and abdominal pain.   Endocrine: Negative  for cold intolerance and heat intolerance.   Genitourinary: Positive for dysuria, flank pain and frequency. Negative for difficulty urinating and dyspareunia.   Musculoskeletal: Positive for myalgias. Negative for arthralgias and back pain.   Skin: Negative for color change and pallor.   Allergic/Immunologic: Negative for environmental allergies and food allergies.   Neurological: Negative for dizziness and facial asymmetry.   Hematological: Negative for adenopathy. Does not bruise/bleed easily.   Psychiatric/Behavioral: Negative for agitation and behavioral problems.     Objective:     Vital Signs (Most Recent):  Temp: 100 °F (37.8 °C) (07/04/18 0418)  Pulse: 86 (07/04/18 0418)  Resp: 17 (07/04/18 0418)  BP: (!) 101/58 (07/04/18 0418)  SpO2: (!) 94 % (07/04/18 0418) Vital Signs (24h Range):  Temp:  [98.4 °F (36.9 °C)-103.1 °F (39.5 °C)] 100 °F (37.8 °C)  Pulse:  [] 86  Resp:  [17-20] 17  SpO2:  [81 %-97 %] 94 %  BP: ()/(50-58) 101/58     Weight: 77.1 kg (170 lb)  Body mass index is 28.29 kg/m².    Physical Exam   Constitutional: No distress.   HENT:   Head: Atraumatic.   Eyes: EOM are normal. Pupils are equal, round, and reactive to light.   Neck: Normal range of motion. Neck supple.   Cardiovascular: Regular rhythm.    tachycardia   Pulmonary/Chest: Effort normal and breath sounds normal.   Abdominal: Soft. Bowel sounds are normal. She exhibits no distension. There is no tenderness.   Musculoskeletal: Normal range of motion. She exhibits tenderness.   Left CVA tenderness    Skin: Skin is warm and dry.   Psychiatric: She has a normal mood and affect. Her behavior is normal.         CRANIAL NERVES     CN III, IV, VI   Pupils are equal, round, and reactive to light.  Extraocular motions are normal.        Significant Labs:   BMP:     Recent Labs  Lab 07/04/18  0341         K 3.9      CO2 24   BUN 12   CREATININE 1.0   CALCIUM 8.8     CBC:     Recent Labs  Lab 07/03/18  1145  07/04/18  0341   WBC 11.08 6.62   HGB 12.7 10.8*   HCT 37.5 33.3*    269       Significant Imaging: reviewed.

## 2018-07-04 NOTE — CONSULTS
[facility]  Urology  Consult Note    Patient Name: Mirta Perry  MRN: [id number]  Admission Date: 7/3/2018  Hospital Length of Stay: 1   Code Status: Full Code   Attending Provider: Swapna Mena MD   Consulting Provider: Rashad Proctor MD  Primary Care Physician: Primary Doctor No  Principal Problem:Pyelonephritis, acute    Consults    Subjective:     HPI:  53wf  Left flank pain 7 days ago  Admitted with temp to 103 yesterday  Cultured and rocephin started  Ct showed 3mm left UVJ stone    Pain resolved overnight  No fever or chills this AM  No NV  Now afebrile and tachycardia has resolved  Pt now feels well although she has not seen the stone pass    Notified of consult at 812a today        Past Medical History:   Diagnosis Date    Acute pyelonephritis 07/03/2018    Fatigue     Fibroids     uterine    Hernia, ventral     s/p cholectectomy    Hypothyroidism, acquired     Migraines     Vitamin D deficiency     Weight gain        Past Surgical History:   Procedure Laterality Date    APPENDECTOMY Bilateral     tummy tuck    BREAST SURGERY Bilateral     augmentation    CARPAL TUNNEL RELEASE Right 05/2014    CHOLECYSTECTOMY      laparoscopic    gastric sleeve      meniscal surgery  May 2014    left knee    rotator cuff surgery Right     TUBAL LIGATION      bitateral       Review of patient's allergies indicates:   Allergen Reactions    Percocet  [oxycodone-acetaminophen]      Other reaction(s): Hives       Family History     Problem Relation (Age of Onset)    Breast cancer     Diabetes Mellitus Mother    Heart disease Father    Parkinsonism Father    Thyroid cancer Father          Social History Main Topics    Smoking status: Never Smoker    Smokeless tobacco: Never Used    Alcohol use No    Drug use: No    Sexual activity: Yes     Partners: Male       Review of Systems    Objective:     Temp:  [98.4 °F (36.9 °C)-103.1 °F (39.5 °C)] 99.1 °F (37.3 °C)  Pulse:  []  83  Resp:  [17-20] 17  SpO2:  [81 %-97 %] 92 %  BP: ()/(50-58) 95/50     Body mass index is 28.29 kg/m².            Drains          No matching active lines, drains, or airways          Physical Exam    Ao*4  resp normal rate' breathing not labored  cv normal rate  Ab nondistended  Ext no edema  No cva tenderness  Ab benign  Soft nontender  Pleasant and conversant; does not appear septic    Significant Labs:    BMP:    Recent Labs  Lab 07/03/18  1145 07/04/18  0341    141   K 4.0 3.9    110   CO2 25 24   BUN 13 12   CREATININE 1.1 1.0   CALCIUM 9.8 8.8       CBC:    Recent Labs  Lab 07/03/18  1145 07/04/18  0341   WBC 11.08 6.62   HGB 12.7 10.8*   HCT 37.5 33.3*    269       Blood Culture:   Recent Labs  Lab 07/03/18  1145 07/03/18  1153   LABBLOO No Growth to date Gram stain efren bottle: Gram negative rods   Results called to and read back by: Leigh Vargas in 4w 07/04/2018    02:01     Urine Culture: No results for input(s): LABURIN in the last 168 hours.    Significant Imaging:  CT: I have reviewed all results within the past 24 hours and my personal findings are:  3mm left UVJ stone with hydro                    Assessment and Plan:     Hydronephrosis with urinary obstruction due to ureteral calculus    Clinically it appears the stone has passed  No need for stent at this time  OK for pt to eat  Will check renal US  If there is persistent hydro consider CT urogram        Sepsis    Clinically appears much better  Cont IV abx per hosp med and monitor cultures            VTE Risk Mitigation         Ordered     IP VTE LOW RISK PATIENT  Once      07/03/18 1604     Place KELLY hose  Until discontinued      07/03/18 1604     Place sequential compression device  Until discontinued      07/03/18 1604          Thank you for your consult. [facility] will follow-up with patient. Please contact us if you have any additional questions.    Rashad Proctor MD  Urology  [facility]

## 2018-07-04 NOTE — HOSPITAL COURSE
Ms. Perry was admitted with sepsis due to acute left pyelonephritis. Workup with CT with stone protocol showed 3 mm urethral obstruction with hydronephrosis and hydroureter and she had left CVA tenderness. Pt was treated empirically with IVF, Rocephin and urine and blood cultures were obtained. Urology was consulted and she underwent further evaluation with retroperitoneal U/S which showed resolution of left sided hydronephrosis and left CVA pain/tenderness resolved. Urine and blood culture was remarkable for E.coli which was pan sensitive. Repeat blood culture were obtained on 7/4/2018 which were NGTD. Treatment options (antibiotics) were discussed with the patient, and initially the plan was to convert to Cipro but patient reported severe reaction with N/V/diarrhea with prolonged symptoms, therefore, we discussed treatment with IV Rocephin with PICC line placement which she was agreeable to since there was no other viable PO antibiotic option due to bacteremia. Home health was arranged on discharge for the patient to complete 14 day course total from date of negative blood culture with Rocephin. Pt arranged for follow up with PCP and Urology.    Antibiotic Regimen:  Rocephin (ceftriaxone) 1 g IV daily   Last dose due on July 18, 2018

## 2018-07-04 NOTE — PLAN OF CARE
Problem: Patient Care Overview  Goal: Plan of Care Review   07/04/18 0337   Coping/Psychosocial   Plan Of Care Reviewed With patient;spouse   Patient resting in bed with  Eyes closed. Resp. Even non-labored no acute distress noted. Able to make needs known. Denies pain discomfort at present time. Patient continues on IV ABX therapy for pyelonephritis. No adverse reactions noted. Pain is being managed with  Current pain medication regimen morphine. Safety maintained, bed locked and in lowest position with  Call light in reach.

## 2018-07-04 NOTE — ASSESSMENT & PLAN NOTE
Per CT rep[ort and clonical  Presentation,Uroogy has been consulted,keep NPO at this time,doubt require intervention.

## 2018-07-04 NOTE — ASSESSMENT & PLAN NOTE
Source possibly acute pyelonephritis,will continue with IV Abx,follow identification,repeat blood culture today.

## 2018-07-04 NOTE — PROGRESS NOTES
"[facility]  Progress Note    Patient Name: Mirta Perry  MRN: [id number]  Patient Class: IP- Inpatient   Admission Date: 7/3/2018  Length of Stay: 1 days  Attending Physician: Swapna Mena MD  Primary Care Provider: Primary Doctor No        Subjective:     Principal Problem:Pyelonephritis, acute    HPI:  54 y/o female with uterine fibroids, hx of kidney stones, hypothyroidism,and hysterectomy presents to the ED c/o x5 day hx of L sided flank pain that now radiates to LLQ abdomen. The pain is severe (9/10) and worse with palpation and urination. She reports a "pulsating" pain with urination. The patient states "it feels like a kidney stone that I got 30 yrs ago." The patient also reports subjective fever, chills, nausea, diarrhea, and dysuria. The patient attempted AZO with slight relief. The patient reports hematuria x5 days ago that resolved after taking AZO. The patient denies emesis, vaginal d/c, or vaginal bleeding.patioent is septic with fever 103,tachycadia 136,source acute pyelonephritis,CT show 3 mm urethral obstruction with hydronephrosis and hydroureter,she has left CVA tenderness as well,cultures has been done and she has been started on IV Abx and IVF.    Hospital Course:  54 y/o female with uterine fibroids, hx of kidney stones, hypothyroidism,and hysterectomy presents to the ED c/o x5 day hx of L sided flank pain that now radiates to LLQ abdomen. The pain is severe (9/10) and worse with palpation and urination. She reports a "pulsating" pain with urination. The patient states "it feels like a kidney stone that I got 30 yrs ago." The patient also reports subjective fever, chills, nausea, diarrhea, and dysuria. The patient attempted AZO with slight relief. The patient reports hematuria x5 days ago that resolved after taking AZO. The patient denies emesis, vaginal d/c, or vaginal bleeding.patioent is septic with fever 103,tachycadia 136,source acute left " pyelonephritis,CT show 3 mm urethral obstruction with hydronephrosis and hydroureter,she has left CVA tenderness as well,cultures has been done and she has been started on IV Abx and IVF.  Patient has GNR on blood culture on 7.3.18,source acute pyelonephritis,repeat blood culture today.  Urology has been consulted.    Past Medical History:   Diagnosis Date    Acute pyelonephritis 07/03/2018    Fatigue     Fibroids     uterine    Hernia, ventral     s/p cholectectomy    Hypothyroidism, acquired     Migraines     Vitamin D deficiency     Weight gain        Past Surgical History:   Procedure Laterality Date    APPENDECTOMY Bilateral     tummy tuck    BREAST SURGERY Bilateral     augmentation    CARPAL TUNNEL RELEASE Right 05/2014    CHOLECYSTECTOMY      laparoscopic    gastric sleeve      meniscal surgery  May 2014    left knee    rotator cuff surgery Right     TUBAL LIGATION      bitateral       Review of patient's allergies indicates:   Allergen Reactions    Percocet  [oxycodone-acetaminophen]      Other reaction(s): Hives       No current facility-administered medications on file prior to encounter.      Current Outpatient Prescriptions on File Prior to Encounter   Medication Sig    ergocalciferol (ERGOCALCIFEROL) 50,000 unit Cap Take 1 capsule (50,000 Units total) by mouth once a week.    esomeprazole (NEXIUM) 40 MG capsule Take 1 capsule (40 mg total) by mouth before breakfast.    SYNTHROID 150 mcg tablet Take 1 tablet (150 mcg total) by mouth before breakfast.     Family History     Problem Relation (Age of Onset)    Breast cancer     Diabetes Mellitus Mother    Heart disease Father    Parkinsonism Father    Thyroid cancer Father        Social History Main Topics    Smoking status: Never Smoker    Smokeless tobacco: Never Used    Alcohol use No    Drug use: No    Sexual activity: Yes     Partners: Male     Review of Systems   Constitutional: Positive for appetite change, chills, fatigue  and fever.   HENT: Negative for congestion and dental problem.    Eyes: Negative for discharge and itching.   Respiratory: Negative for apnea and chest tightness.    Cardiovascular: Negative for chest pain and leg swelling.   Gastrointestinal: Negative for abdominal distention and abdominal pain.   Endocrine: Negative for cold intolerance and heat intolerance.   Genitourinary: Positive for dysuria, flank pain and frequency. Negative for difficulty urinating and dyspareunia.   Musculoskeletal: Positive for myalgias. Negative for arthralgias and back pain.   Skin: Negative for color change and pallor.   Allergic/Immunologic: Negative for environmental allergies and food allergies.   Neurological: Negative for dizziness and facial asymmetry.   Hematological: Negative for adenopathy. Does not bruise/bleed easily.   Psychiatric/Behavioral: Negative for agitation and behavioral problems.     Objective:     Vital Signs (Most Recent):  Temp: 100 °F (37.8 °C) (07/04/18 0418)  Pulse: 86 (07/04/18 0418)  Resp: 17 (07/04/18 0418)  BP: (!) 101/58 (07/04/18 0418)  SpO2: (!) 94 % (07/04/18 0418) Vital Signs (24h Range):  Temp:  [98.4 °F (36.9 °C)-103.1 °F (39.5 °C)] 100 °F (37.8 °C)  Pulse:  [] 86  Resp:  [17-20] 17  SpO2:  [81 %-97 %] 94 %  BP: ()/(50-58) 101/58     Weight: 77.1 kg (170 lb)  Body mass index is 28.29 kg/m².    Physical Exam   Constitutional: No distress.   HENT:   Head: Atraumatic.   Eyes: EOM are normal. Pupils are equal, round, and reactive to light.   Neck: Normal range of motion. Neck supple.   Cardiovascular: Regular rhythm.    tachycardia   Pulmonary/Chest: Effort normal and breath sounds normal.   Abdominal: Soft. Bowel sounds are normal. She exhibits no distension. There is no tenderness.   Musculoskeletal: Normal range of motion. She exhibits tenderness.   Left CVA tenderness    Skin: Skin is warm and dry.   Psychiatric: She has a normal mood and affect. Her behavior is normal.         CRANIAL  NERVES     CN III, IV, VI   Pupils are equal, round, and reactive to light.  Extraocular motions are normal.        Significant Labs:   BMP:     Recent Labs  Lab 07/04/18  0341         K 3.9      CO2 24   BUN 12   CREATININE 1.0   CALCIUM 8.8     CBC:     Recent Labs  Lab 07/03/18  1145 07/04/18  0341   WBC 11.08 6.62   HGB 12.7 10.8*   HCT 37.5 33.3*    269       Significant Imaging: reviewed.    Assessment/Plan:      * Pyelonephritis, acute      Per clinical presentation with dysuria,CVA tenderness,positive  UA,will continue with IVF,IV Abx,follow cultures.        Hydroureter on left    Per CT ,Urology has been consulted.          Hydronephrosis with urinary obstruction due to ureteral calculus    Per CT rep[ort and clonical  Presentation,Uroogy has been consulted,keep NPO at this time,doubt require intervention.          Sepsis    With fever 103,tachycadia,source acute pyelonephritis,on IVF and  IV Abx,fever is improved,but still is better.        Hypothyroidism, acquired    On synthroid.          Bacteremia due to Gram-negative bacteria    Source possibly acute pyelonephritis,will continue with IV Abx,follow identification,repeat blood culture today.            VTE Risk Mitigation         Ordered     IP VTE LOW RISK PATIENT  Once      07/03/18 1604     Place KELLY hose  Until discontinued      07/03/18 1604     Place sequential compression device  Until discontinued      07/03/18 1604              Swapna Mena MD  Department of Hospital Medicine   [facility]

## 2018-07-04 NOTE — PLAN OF CARE
"SW met with patient to complete discharge needs assessment. SW reviewed with patient contents of "[facility]" including "help at home", "things patient responsible for to manage her health at home" and "preferences". Patient was able to verbalize her help at home is her spouse and adult daughter Naomie. SW discussed with patient how she will manage his health at home is by going on her doctor appointments, getting prescriptions filled, and taking medications as prescribed. SW wrote name and phone number on white communication board. Patient prefers to schedule her own PCP follow-up appointment, however prefers morning doctor appointments if with a specialist.         [facility] [address]  [address]  [place] [address]  Phone: [phone] Fax: [phone]           07/04/18 1550   Discharge Assessment   Assessment Type Discharge Planning Assessment   Confirmed/corrected address and phone number on facesheet? Yes   Assessment information obtained from? Patient   Communicated expected length of stay with patient/caregiver no   Prior to hospitilization cognitive status: Alert/Oriented;No Deficits   Prior to hospitalization functional status: Independent   Current cognitive status: Alert/Oriented;No Deficits   Current Functional Status: Independent   Facility Arrived From: Home   Lives With spouse;child(diann), adult   Able to Return to Prior Arrangements yes   Is patient able to care for self after discharge? Yes   Who are your caregiver(s) and their phone number(s)? Reji Aviles (spouse) [phone]   Patient's perception of discharge disposition home or selfcare   Readmission Within The Last 30 Days no previous admission in last 30 days   Patient currently being followed by outpatient case management? No   Patient currently receives any other outside agency services? No   Equipment Currently Used at Home none   Do you have any problems affording any of your prescribed " medications? No   Is the patient taking medications as prescribed? yes   Does the patient have transportation home? Yes   Transportation Available car;family or friend will provide   Does the patient receive services at the [facility]? No   Discharge Plan A Home with family  (PCP F/U)   Discharge Plan B Home with family   Patient/Family In Agreement With Plan yes   Does the patient have transportation to healthcare appointments? Yes

## 2018-07-04 NOTE — ASSESSMENT & PLAN NOTE
Clinically it appears the stone has passed  No need for stent at this time  OK for pt to eat  Will check renal US  If there is persistent hydro consider CT urogram

## 2018-07-04 NOTE — HPI
53wf  Left flank pain 7 days ago  Admitted with temp to 103 yesterday  Cultured and rocephin started  Ct showed 3mm left UVJ stone    Pain resolved overnight  No fever or chills this AM  No NV  Now afebrile and tachycardia has resolved  Pt now feels well although she has not seen the stone pass    Notified of consult at 812a today

## 2018-07-04 NOTE — PLAN OF CARE
Problem: Patient Care Overview  Goal: Plan of Care Review  Outcome: Ongoing (interventions implemented as appropriate)  Patient is A/Ox4, remains free from falls, is afebrile, states no pain.  Patient tolerating diet well.  Ambulates to restroom independently, voids spontaneously, states no pain during urination.

## 2018-07-04 NOTE — SUBJECTIVE & OBJECTIVE
Past Medical History:   Diagnosis Date    Acute pyelonephritis 07/03/2018    Fatigue     Fibroids     uterine    Hernia, ventral     s/p cholectectomy    Hypothyroidism, acquired     Migraines     Vitamin D deficiency     Weight gain        Past Surgical History:   Procedure Laterality Date    APPENDECTOMY Bilateral     tummy tuck    BREAST SURGERY Bilateral     augmentation    CARPAL TUNNEL RELEASE Right 05/2014    CHOLECYSTECTOMY      laparoscopic    gastric sleeve      meniscal surgery  May 2014    left knee    rotator cuff surgery Right     TUBAL LIGATION      bitateral       Review of patient's allergies indicates:   Allergen Reactions    Percocet  [oxycodone-acetaminophen]      Other reaction(s): Hives       Family History     Problem Relation (Age of Onset)    Breast cancer     Diabetes Mellitus Mother    Heart disease Father    Parkinsonism Father    Thyroid cancer Father          Social History Main Topics    Smoking status: Never Smoker    Smokeless tobacco: Never Used    Alcohol use No    Drug use: No    Sexual activity: Yes     Partners: Male       Review of Systems    Objective:     Temp:  [98.4 °F (36.9 °C)-103.1 °F (39.5 °C)] 99.1 °F (37.3 °C)  Pulse:  [] 83  Resp:  [17-20] 17  SpO2:  [81 %-97 %] 92 %  BP: ()/(50-58) 95/50     Body mass index is 28.29 kg/m².            Drains          No matching active lines, drains, or airways          Physical Exam    Ao*4  resp normal rate' breathing not labored  cv normal rate  Ab nondistended  Ext no edema  No cva tenderness  Ab benign  Soft nontender  Pleasant and conversant; does not appear septic    Significant Labs:    BMP:    Recent Labs  Lab 07/03/18  1145 07/04/18  0341    141   K 4.0 3.9    110   CO2 25 24   BUN 13 12   CREATININE 1.1 1.0   CALCIUM 9.8 8.8       CBC:    Recent Labs  Lab 07/03/18  1145 07/04/18  0341   WBC 11.08 6.62   HGB 12.7 10.8*   HCT 37.5 33.3*    269       Blood Culture:   Recent  Labs  Lab 07/03/18  1145 07/03/18  1153   LABBLOO No Growth to date Gram stain efren bottle: Gram negative rods   Results called to and read back by: Leigh Vargas in 4w 07/04/2018    02:01     Urine Culture: No results for input(s): LABURIN in the last 168 hours.    Significant Imaging:  CT: I have reviewed all results within the past 24 hours and my personal findings are:  3mm left UVJ stone with hydro

## 2018-07-05 LAB
ALBUMIN SERPL BCP-MCNC: 2.5 G/DL
ALP SERPL-CCNC: 92 U/L
ALT SERPL W/O P-5'-P-CCNC: 30 U/L
ANION GAP SERPL CALC-SCNC: 8 MMOL/L
AST SERPL-CCNC: 32 U/L
BACTERIA BLD CULT: NORMAL
BACTERIA UR CULT: NORMAL
BASOPHILS # BLD AUTO: 0.03 K/UL
BASOPHILS NFR BLD: 0.6 %
BILIRUB SERPL-MCNC: 0.3 MG/DL
BUN SERPL-MCNC: 10 MG/DL
CALCIUM SERPL-MCNC: 8.7 MG/DL
CHLORIDE SERPL-SCNC: 112 MMOL/L
CO2 SERPL-SCNC: 22 MMOL/L
CREAT SERPL-MCNC: 0.9 MG/DL
DIFFERENTIAL METHOD: ABNORMAL
EOSINOPHIL # BLD AUTO: 0.1 K/UL
EOSINOPHIL NFR BLD: 1.5 %
ERYTHROCYTE [DISTWIDTH] IN BLOOD BY AUTOMATED COUNT: 12 %
EST. GFR  (AFRICAN AMERICAN): >60 ML/MIN/1.73 M^2
EST. GFR  (NON AFRICAN AMERICAN): >60 ML/MIN/1.73 M^2
GLUCOSE SERPL-MCNC: 91 MG/DL
HCT VFR BLD AUTO: 29.6 %
HGB BLD-MCNC: 9.8 G/DL
LYMPHOCYTES # BLD AUTO: 1 K/UL
LYMPHOCYTES NFR BLD: 21 %
MCH RBC QN AUTO: 29.3 PG
MCHC RBC AUTO-ENTMCNC: 33.1 G/DL
MCV RBC AUTO: 89 FL
MONOCYTES # BLD AUTO: 0.8 K/UL
MONOCYTES NFR BLD: 16.8 %
NEUTROPHILS # BLD AUTO: 2.9 K/UL
NEUTROPHILS NFR BLD: 59.9 %
PLATELET # BLD AUTO: 236 K/UL
PMV BLD AUTO: 10 FL
POTASSIUM SERPL-SCNC: 3.9 MMOL/L
PROT SERPL-MCNC: 5.6 G/DL
RBC # BLD AUTO: 3.34 M/UL
SODIUM SERPL-SCNC: 142 MMOL/L
WBC # BLD AUTO: 4.77 K/UL

## 2018-07-05 PROCEDURE — 63600175 PHARM REV CODE 636 W HCPCS: Performed by: HOSPITALIST

## 2018-07-05 PROCEDURE — 99232 SBSQ HOSP IP/OBS MODERATE 35: CPT | Mod: ,,, | Performed by: UROLOGY

## 2018-07-05 PROCEDURE — 25000003 PHARM REV CODE 250: Performed by: NURSE PRACTITIONER

## 2018-07-05 PROCEDURE — 80053 COMPREHEN METABOLIC PANEL: CPT

## 2018-07-05 PROCEDURE — 36415 COLL VENOUS BLD VENIPUNCTURE: CPT

## 2018-07-05 PROCEDURE — 63600175 PHARM REV CODE 636 W HCPCS: Performed by: NURSE PRACTITIONER

## 2018-07-05 PROCEDURE — 11000001 HC ACUTE MED/SURG PRIVATE ROOM

## 2018-07-05 PROCEDURE — 85025 COMPLETE CBC W/AUTO DIFF WBC: CPT

## 2018-07-05 PROCEDURE — 94761 N-INVAS EAR/PLS OXIMETRY MLT: CPT

## 2018-07-05 RX ADMIN — ACETAMINOPHEN 650 MG: 325 TABLET, FILM COATED ORAL at 01:07

## 2018-07-05 RX ADMIN — ACETAMINOPHEN 650 MG: 325 TABLET, FILM COATED ORAL at 11:07

## 2018-07-05 RX ADMIN — SODIUM CHLORIDE: 0.9 INJECTION, SOLUTION INTRAVENOUS at 08:07

## 2018-07-05 RX ADMIN — CEFTRIAXONE 1 G: 1 INJECTION, SOLUTION INTRAVENOUS at 09:07

## 2018-07-05 RX ADMIN — CEFTRIAXONE 1 G: 1 INJECTION, SOLUTION INTRAVENOUS at 08:07

## 2018-07-05 RX ADMIN — ONDANSETRON 4 MG: 2 INJECTION INTRAMUSCULAR; INTRAVENOUS at 09:07

## 2018-07-05 RX ADMIN — LEVOTHYROXINE SODIUM 150 MCG: 150 TABLET ORAL at 06:07

## 2018-07-05 RX ADMIN — PANTOPRAZOLE SODIUM 40 MG: 40 TABLET, DELAYED RELEASE ORAL at 08:07

## 2018-07-05 NOTE — PLAN OF CARE
Problem: Patient Care Overview  Goal: Plan of Care Review  Outcome: Ongoing (interventions implemented as appropriate)   07/05/18 0536   Coping/Psychosocial   Plan Of Care Reviewed With patient   Pt is AAOx4. Pt remains free from falls. Pt ambulates to bathroom. IV remains patent. NS @ 125. Pt denies pain. Pt c/o about rash on the lower back. Pt states the rash is new. MD notified. Benadryl given as ordered. Safety maintained: bed in lowest position with wheels locked, personal items and call light within reach, SRx2. No distress noted, will continue to monitor.

## 2018-07-05 NOTE — CONSULTS
[facility]  Urology  Consult Note    Patient Name: Mirta Perry  MRN: [id number]  Admission Date: 7/3/2018  Hospital Length of Stay: 2   Code Status: Full Code   Attending Provider: Ludivina Benitez MD   Consulting Provider: Susanne Nicolas MD  Primary Care Physician: Cherie Del Toro MD  Principal Problem:Pyelonephritis, acute    Inpatient consult to Urology  Consult performed by: SUSANNE NICOLAS  Consult ordered by: KERI ADAME          Subjective:     HPI:  53wf  Left flank pain 7 days ago  Admitted with temp to 103 yesterday  Cultured and rocephin started  Ct showed 3mm left UVJ stone    Pain resolved overnight  No fever or chills this AM  No NV  Now afebrile and tachycardia has resolved  Pt now feels well although she has not seen the stone pass    Notified of consult at 812a today    Interval History: LG fever overnight with associated chills. No further flank pain. Denies dysuria.    Past Medical History:   Diagnosis Date    Acute pyelonephritis 07/03/2018    Fatigue     Fibroids     uterine    Hernia, ventral     s/p cholectectomy    Hypothyroidism, acquired     Migraines     Vitamin D deficiency     Weight gain        Past Surgical History:   Procedure Laterality Date    APPENDECTOMY Bilateral     tummy tuck    BREAST SURGERY Bilateral     augmentation    CARPAL TUNNEL RELEASE Right 05/2014    CHOLECYSTECTOMY      laparoscopic    gastric sleeve      meniscal surgery  May 2014    left knee    rotator cuff surgery Right     TUBAL LIGATION      bitateral       Review of patient's allergies indicates:   Allergen Reactions    Percocet  [oxycodone-acetaminophen]      Other reaction(s): Hives       Family History     Problem Relation (Age of Onset)    Breast cancer     Diabetes Mellitus Mother    Heart disease Father    Parkinsonism Father    Thyroid cancer Father          Social History Main Topics    Smoking status: Never Smoker    Smokeless  tobacco: Never Used    Alcohol use No    Drug use: No    Sexual activity: Yes     Partners: Male       Review of Systems   Constitutional: Positive for fever. Negative for appetite change and chills.   HENT: Negative for congestion, sore throat and trouble swallowing.    Eyes: Negative for pain and itching.   Respiratory: Negative for cough and shortness of breath.    Cardiovascular: Negative for chest pain, palpitations and leg swelling.   Gastrointestinal: Negative for abdominal distention, abdominal pain, constipation, diarrhea, nausea and vomiting.   Genitourinary: Negative for difficulty urinating, dysuria, flank pain, hematuria, nocturia and urgency.   Musculoskeletal: Negative for back pain, neck pain and neck stiffness.   Skin: Negative for rash and wound.   Neurological: Negative for dizziness and seizures.   Hematological: Negative for adenopathy. Does not bruise/bleed easily.   Psychiatric/Behavioral: Negative for confusion. The patient is not nervous/anxious.    All other systems reviewed and are negative.      Objective:     Temp:  [98.4 °F (36.9 °C)-100 °F (37.8 °C)] 98.6 °F (37 °C)  Pulse:  [60-91] 65  Resp:  [17-18] 17  SpO2:  [94 %-98 %] 98 %  BP: ()/(48-56) 102/56     Body mass index is 28.29 kg/m².      Date 07/05/18 0700 - 07/06/18 0659   Shift 5078-7409 5035-1907 9521-8210 24 Hour Total   I  N  T  A  K  E   I.V.  (mL/kg) 1500  (19.5)   1500  (19.5)    IV Piggyback 50   50    Shift Total  (mL/kg) 1550  (20.1)   1550  (20.1)   O  U  T  P  U  T   Shift Total  (mL/kg)       Weight (kg) 77.1 77.1 77.1 77.1          Drains          No matching active lines, drains, or airways          Physical Exam   Vitals reviewed.  Constitutional: She is oriented to person, place, and time. She appears well-developed and well-nourished. No distress.   HENT:   Head: Normocephalic and atraumatic.   Neck: Normal range of motion.   Cardiovascular: Normal rate and regular rhythm.    Pulmonary/Chest: Effort  normal and breath sounds normal. No respiratory distress.   Abdominal: Soft. She exhibits no distension and no mass. There is no tenderness. There is no rebound and no guarding.   Musculoskeletal: Normal range of motion.   Neurological: She is alert and oriented to person, place, and time.   Skin: Skin is warm and dry. No rash noted. She is not diaphoretic. No erythema.     Psychiatric: She has a normal mood and affect. Her behavior is normal.       Significant Labs:    BMP:    Recent Labs  Lab 07/03/18  1145 07/04/18  0341 07/05/18  0427    141 142   K 4.0 3.9 3.9    110 112*   CO2 25 24 22*   BUN 13 12 10   CREATININE 1.1 1.0 0.9   CALCIUM 9.8 8.8 8.7       CBC:    Recent Labs  Lab 07/03/18  1145 07/04/18  0341 07/05/18  0427   WBC 11.08 6.62 4.77   HGB 12.7 10.8* 9.8*   HCT 37.5 33.3* 29.6*    269 236       Blood Culture:   Recent Labs  Lab 07/03/18  1145 07/03/18  1153 07/04/18  0723   LABBLOO No Growth to date  No Growth to date Gram stain efren bottle: Gram negative rods   Results called to and read back by: Leigh Vargas in 4w 07/04/2018    02:01  ESCHERICHIA COLI No Growth to date  No Growth to date     Urine Culture:   Recent Labs  Lab 07/03/18  1118   LABURIN ESCHERICHIA COLI>100,000 cfu/ml     Urine Studies:   Recent Labs  Lab 07/03/18  1118   COLORU Odalis   APPEARANCEUA Cloudy*   PHUR 5.0   SPECGRAV 1.020   PROTEINUA 2+*   GLUCUA Negative   KETONESU Negative   BILIRUBINUA Negative   OCCULTUA 2+*   NITRITE Positive*   UROBILINOGEN 2.0-3.0*   LEUKOCYTESUR 3+*   RBCUA 2   WBCUA 60*   BACTERIA Moderate*   HYALINECASTS 0       Significant Imaging:  All pertinent imaging results/findings from the past 24 hours have been reviewed.                Assessment and Plan:     * Pyelonephritis, acute     - Continue abx        Hydronephrosis with urinary obstruction due to ureteral calculus    Clinically it appears the stone has passed  No need for stent at this time  CLAU - no further  hydronephrosis  Consider CT urogram if pain recurs        Sepsis    Clinically appears much better  Cont abx per hosp med and monitor cultures            VTE Risk Mitigation         Ordered     IP VTE LOW RISK PATIENT  Once      07/03/18 1604     Place KELLY hose  Until discontinued      07/03/18 1604     Place sequential compression device  Until discontinued      07/03/18 1604          Thank you for your consult. I will follow-up with patient. Please contact us if you have any additional questions.    Susanne Nicolas MD  Urology  [facility]

## 2018-07-05 NOTE — SUBJECTIVE & OBJECTIVE
Interval History: LG fever overnight with associated chills. No further flank pain. Denies dysuria.    Past Medical History:   Diagnosis Date    Acute pyelonephritis 07/03/2018    Fatigue     Fibroids     uterine    Hernia, ventral     s/p cholectectomy    Hypothyroidism, acquired     Migraines     Vitamin D deficiency     Weight gain        Past Surgical History:   Procedure Laterality Date    APPENDECTOMY Bilateral     tummy tuck    BREAST SURGERY Bilateral     augmentation    CARPAL TUNNEL RELEASE Right 05/2014    CHOLECYSTECTOMY      laparoscopic    gastric sleeve      meniscal surgery  May 2014    left knee    rotator cuff surgery Right     TUBAL LIGATION      bitateral       Review of patient's allergies indicates:   Allergen Reactions    Percocet  [oxycodone-acetaminophen]      Other reaction(s): Hives       Family History     Problem Relation (Age of Onset)    Breast cancer     Diabetes Mellitus Mother    Heart disease Father    Parkinsonism Father    Thyroid cancer Father          Social History Main Topics    Smoking status: Never Smoker    Smokeless tobacco: Never Used    Alcohol use No    Drug use: No    Sexual activity: Yes     Partners: Male       Review of Systems   Constitutional: Positive for fever. Negative for appetite change and chills.   HENT: Negative for congestion, sore throat and trouble swallowing.    Eyes: Negative for pain and itching.   Respiratory: Negative for cough and shortness of breath.    Cardiovascular: Negative for chest pain, palpitations and leg swelling.   Gastrointestinal: Negative for abdominal distention, abdominal pain, constipation, diarrhea, nausea and vomiting.   Genitourinary: Negative for difficulty urinating, dysuria, flank pain, hematuria, nocturia and urgency.   Musculoskeletal: Negative for back pain, neck pain and neck stiffness.   Skin: Negative for rash and wound.   Neurological: Negative for dizziness and seizures.   Hematological:  Negative for adenopathy. Does not bruise/bleed easily.   Psychiatric/Behavioral: Negative for confusion. The patient is not nervous/anxious.    All other systems reviewed and are negative.      Objective:     Temp:  [98.4 °F (36.9 °C)-100 °F (37.8 °C)] 98.6 °F (37 °C)  Pulse:  [60-91] 65  Resp:  [17-18] 17  SpO2:  [94 %-98 %] 98 %  BP: ()/(48-56) 102/56     Body mass index is 28.29 kg/m².      Date 07/05/18 0700 - 07/06/18 0659   Shift 8189-7948 2428-0659 8923-1004 24 Hour Total   I  N  T  A  K  E   I.V.  (mL/kg) 1500  (19.5)   1500  (19.5)    IV Piggyback 50   50    Shift Total  (mL/kg) 1550  (20.1)   1550  (20.1)   O  U  T  P  U  T   Shift Total  (mL/kg)       Weight (kg) 77.1 77.1 77.1 77.1          Drains          No matching active lines, drains, or airways          Physical Exam   Vitals reviewed.  Constitutional: She is oriented to person, place, and time. She appears well-developed and well-nourished. No distress.   HENT:   Head: Normocephalic and atraumatic.   Neck: Normal range of motion.   Cardiovascular: Normal rate and regular rhythm.    Pulmonary/Chest: Effort normal and breath sounds normal. No respiratory distress.   Abdominal: Soft. She exhibits no distension and no mass. There is no tenderness. There is no rebound and no guarding.   Musculoskeletal: Normal range of motion.   Neurological: She is alert and oriented to person, place, and time.   Skin: Skin is warm and dry. No rash noted. She is not diaphoretic. No erythema.     Psychiatric: She has a normal mood and affect. Her behavior is normal.       Significant Labs:    BMP:    Recent Labs  Lab 07/03/18  1145 07/04/18  0341 07/05/18  0427    141 142   K 4.0 3.9 3.9    110 112*   CO2 25 24 22*   BUN 13 12 10   CREATININE 1.1 1.0 0.9   CALCIUM 9.8 8.8 8.7       CBC:    Recent Labs  Lab 07/03/18  1145 07/04/18  0341 07/05/18  0427   WBC 11.08 6.62 4.77   HGB 12.7 10.8* 9.8*   HCT 37.5 33.3* 29.6*    269 236       Blood  Culture:   Recent Labs  Lab 07/03/18  1145 07/03/18  1153 07/04/18  0723   LABBLOO No Growth to date  No Growth to date Gram stain efren bottle: Gram negative rods   Results called to and read back by: Leigh Vargas in 4w 07/04/2018    02:01  ESCHERICHIA COLI No Growth to date  No Growth to date     Urine Culture:   Recent Labs  Lab 07/03/18  1118   LABURIN ESCHERICHIA COLI>100,000 cfu/ml     Urine Studies:   Recent Labs  Lab 07/03/18  1118   COLORU Odalis   APPEARANCEUA Cloudy*   PHUR 5.0   SPECGRAV 1.020   PROTEINUA 2+*   GLUCUA Negative   KETONESU Negative   BILIRUBINUA Negative   OCCULTUA 2+*   NITRITE Positive*   UROBILINOGEN 2.0-3.0*   LEUKOCYTESUR 3+*   RBCUA 2   WBCUA 60*   BACTERIA Moderate*   HYALINECASTS 0       Significant Imaging:  All pertinent imaging results/findings from the past 24 hours have been reviewed.

## 2018-07-05 NOTE — PLAN OF CARE
Problem: Patient Care Overview  Goal: Plan of Care Review  Outcome: Ongoing (interventions implemented as appropriate)  Patient is A/Ox4, remains free from falls, has remained afebrile this shift, states no pain.  Patient voiding spontaneously.  Tolerating IV fluids and diet well.

## 2018-07-05 NOTE — ASSESSMENT & PLAN NOTE
Clinically it appears the stone has passed  No need for stent at this time  CLAU - no further hydronephrosis  Consider CT urogram if pain recurs

## 2018-07-06 VITALS
RESPIRATION RATE: 18 BRPM | HEIGHT: 65 IN | OXYGEN SATURATION: 97 % | TEMPERATURE: 98 F | WEIGHT: 170 LBS | SYSTOLIC BLOOD PRESSURE: 104 MMHG | HEART RATE: 61 BPM | BODY MASS INDEX: 28.32 KG/M2 | DIASTOLIC BLOOD PRESSURE: 58 MMHG

## 2018-07-06 PROBLEM — R78.81 BACTEREMIA DUE TO GRAM-NEGATIVE BACTERIA: Status: RESOLVED | Noted: 2018-07-04 | Resolved: 2018-07-06

## 2018-07-06 PROBLEM — N10 PYELONEPHRITIS, ACUTE: Status: RESOLVED | Noted: 2018-07-03 | Resolved: 2018-07-06

## 2018-07-06 PROBLEM — A41.9 SEPSIS: Status: RESOLVED | Noted: 2018-07-03 | Resolved: 2018-07-06

## 2018-07-06 PROBLEM — N13.2 HYDRONEPHROSIS WITH URINARY OBSTRUCTION DUE TO URETERAL CALCULUS: Status: RESOLVED | Noted: 2018-07-03 | Resolved: 2018-07-06

## 2018-07-06 PROBLEM — N13.4 HYDROURETER ON LEFT: Status: RESOLVED | Noted: 2018-07-03 | Resolved: 2018-07-06

## 2018-07-06 LAB
ALBUMIN SERPL BCP-MCNC: 2.8 G/DL
ALP SERPL-CCNC: 111 U/L
ALT SERPL W/O P-5'-P-CCNC: 33 U/L
ANION GAP SERPL CALC-SCNC: 10 MMOL/L
AST SERPL-CCNC: 31 U/L
BASOPHILS # BLD AUTO: 0.03 K/UL
BASOPHILS NFR BLD: 0.6 %
BILIRUB SERPL-MCNC: 0.3 MG/DL
BUN SERPL-MCNC: 10 MG/DL
CALCIUM SERPL-MCNC: 9.2 MG/DL
CHLORIDE SERPL-SCNC: 113 MMOL/L
CO2 SERPL-SCNC: 22 MMOL/L
CREAT SERPL-MCNC: 0.8 MG/DL
DIFFERENTIAL METHOD: ABNORMAL
EOSINOPHIL # BLD AUTO: 0.1 K/UL
EOSINOPHIL NFR BLD: 2.6 %
ERYTHROCYTE [DISTWIDTH] IN BLOOD BY AUTOMATED COUNT: 12.2 %
EST. GFR  (AFRICAN AMERICAN): >60 ML/MIN/1.73 M^2
EST. GFR  (NON AFRICAN AMERICAN): >60 ML/MIN/1.73 M^2
GLUCOSE SERPL-MCNC: 93 MG/DL
HCT VFR BLD AUTO: 30.7 %
HGB BLD-MCNC: 9.9 G/DL
LYMPHOCYTES # BLD AUTO: 1.3 K/UL
LYMPHOCYTES NFR BLD: 23.8 %
MCH RBC QN AUTO: 28.8 PG
MCHC RBC AUTO-ENTMCNC: 32.2 G/DL
MCV RBC AUTO: 89 FL
MONOCYTES # BLD AUTO: 0.7 K/UL
MONOCYTES NFR BLD: 12.5 %
NEUTROPHILS # BLD AUTO: 3.3 K/UL
NEUTROPHILS NFR BLD: 60.3 %
PLATELET # BLD AUTO: 237 K/UL
PMV BLD AUTO: 10.1 FL
POTASSIUM SERPL-SCNC: 4.4 MMOL/L
PROT SERPL-MCNC: 6.1 G/DL
RBC # BLD AUTO: 3.44 M/UL
SODIUM SERPL-SCNC: 145 MMOL/L
WBC # BLD AUTO: 5.42 K/UL

## 2018-07-06 PROCEDURE — 80053 COMPREHEN METABOLIC PANEL: CPT

## 2018-07-06 PROCEDURE — 36415 COLL VENOUS BLD VENIPUNCTURE: CPT

## 2018-07-06 PROCEDURE — 63600175 PHARM REV CODE 636 W HCPCS: Performed by: HOSPITALIST

## 2018-07-06 PROCEDURE — 02HV33Z INSERTION OF INFUSION DEVICE INTO SUPERIOR VENA CAVA, PERCUTANEOUS APPROACH: ICD-10-PCS | Performed by: HOSPITALIST

## 2018-07-06 PROCEDURE — 36569 INSJ PICC 5 YR+ W/O IMAGING: CPT

## 2018-07-06 PROCEDURE — 25000003 PHARM REV CODE 250: Performed by: NURSE PRACTITIONER

## 2018-07-06 PROCEDURE — 99232 SBSQ HOSP IP/OBS MODERATE 35: CPT | Mod: ,,, | Performed by: UROLOGY

## 2018-07-06 PROCEDURE — 63600175 PHARM REV CODE 636 W HCPCS: Performed by: NURSE PRACTITIONER

## 2018-07-06 PROCEDURE — 85025 COMPLETE CBC W/AUTO DIFF WBC: CPT

## 2018-07-06 RX ORDER — ONDANSETRON HYDROCHLORIDE 8 MG/1
8 TABLET, FILM COATED ORAL EVERY 8 HOURS PRN
Qty: 30 TABLET | Refills: 0 | Status: SHIPPED | OUTPATIENT
Start: 2018-07-06 | End: 2019-07-09

## 2018-07-06 RX ORDER — ACETAMINOPHEN 325 MG/1
650 TABLET ORAL EVERY 8 HOURS PRN
Refills: 0 | COMMUNITY
Start: 2018-07-06 | End: 2019-07-09

## 2018-07-06 RX ADMIN — ONDANSETRON 4 MG: 2 INJECTION INTRAMUSCULAR; INTRAVENOUS at 06:07

## 2018-07-06 RX ADMIN — CEFTRIAXONE 1 G: 1 INJECTION, SOLUTION INTRAVENOUS at 06:07

## 2018-07-06 RX ADMIN — LEVOTHYROXINE SODIUM 150 MCG: 150 TABLET ORAL at 06:07

## 2018-07-06 RX ADMIN — PANTOPRAZOLE SODIUM 40 MG: 40 TABLET, DELAYED RELEASE ORAL at 09:07

## 2018-07-06 NOTE — PROGRESS NOTES
Vee, RN with [facility] contacted EDGARDO to report a nurse on the way to hospital to provide teaching for IV abx. Vee reported her office currently running insurance to verify benefits. EDGARDO uploaded clinicals to [facility] because [facility] did not accept patient, unable to meet needs. EDGARDO spoke to Ger that reported office working on verifying insurance benefits.

## 2018-07-06 NOTE — PROGRESS NOTES
Follow-up Information     Cherie Del Toro MD. Schedule an appointment as soon as possible for a visit in 1 week.    Specialty:  Internal Medicine  Why:  Outpatient Services, PCP Follow-up Appointment  Contact information:  [address]  [address]  [place] [address]  [phone]             Susanne Nicolas MD. Go on 7/20/2018.    Specialty:  Urology  Why:  Outpatient Services, Urology Follow-up Appointment, Please arrive to clinic for 9:00AM, You will see DARRYN Ferro  Contact information:  [address]  [address]  [place] [address]  [phone]             [facility].    Specialties:  Pharmacist, DME Provider, IV Infusion  Why:  Infusion Provider (Home IV Abx)  Contact information:  [address] [place] [address]  [phone]             [facility]    Specialty:  Home Health Services  Why:  Home Health Provider  Contact information:  [address] [facility] [address]  [address]  [place] [address]  [phone]                   [facility]    WRITTEN HEALTHCARE AND DISCHARGE INFORMATION                        Help at Home           [phone]  After discharge for assistance [facility] On Call Nurse Care Line 24/7  Assistance    Things You are responsible For To Manage Your Care At Home:  1.    Getting your prescriptions filled   2.    Taking your medications as directed, DO NOT MISS ANY DOSES!  3.    Going to your follow-up doctor appointment. This is important because it  allow the doctor to monitor your progress and determine if  any changes need to made to your treatment plan.     Thank you for choosing [facility] for your care.  Please answer any calls you may receive from [facility] we want to continue to support you as you manage your healthcare needs. [facility] is happy to have the opportunity to serve you.     Sincerely,  Your [facility] Team,  Bailey Cardoza LMSW   II  [phone]

## 2018-07-06 NOTE — ASSESSMENT & PLAN NOTE
- Source due to acute pyelonephritis  - Continue Rocephin  - Repeat blood culture NGTD  - As discussed above

## 2018-07-06 NOTE — PLAN OF CARE
[facility]    HOME HEALTH ORDERS  FACE TO FACE ENCOUNTER    Patient Name: Mirta Perry  YOB: 1965    PCP: Cherie Del Toro MD   PCP Address: [address] [place] [address] / [facility] [address]  PCP Phone Number: [phone]  PCP Fax: [phone]    Encounter Date: 07/06/2018    Admit to Home Health    Diagnoses:  Active Hospital Problems    Diagnosis  POA    Hypothyroidism, acquired [E03.9]  Yes      Resolved Hospital Problems    Diagnosis Date Resolved POA    *Pyelonephritis, acute [N10] 07/06/2018 Yes     Priority: 1 - High    Sepsis [A41.9] 07/06/2018 Yes     Priority: 2     Hydronephrosis with urinary obstruction due to ureteral calculus [N13.2] 07/06/2018 Yes     Priority: 3     Hydroureter on left [N13.4] 07/06/2018 Yes     Priority: 4     Bacteremia due to Gram-negative bacteria [R78.81] 07/06/2018 Yes     Priority: 5        No future appointments.  Follow-up Information     Cherie Del Toro MD In 1 week.    Specialty:  Internal Medicine  Contact information:  [address] [place] [id number] [phone]                     I have seen and examined this patient face to face today. My clinical findings that support the need for the home health skilled services and home bound status are the following:  Weakness/numbness causing balance and gait disturbance due to Infection making it taxing to leave home.    Allergies:  Review of patient's allergies indicates:   Allergen Reactions    Ciprofloxacin     Percocet  [oxycodone-acetaminophen]      Other reaction(s): Hives       Diet: regular diet    Activities: activity as tolerated    Nursing:   SN to complete comprehensive assessment including routine vital signs. Instruct on disease process and s/s of complications to report to MD. Review/verify medication list sent home with the patient at time of discharge  and instruct patient/caregiver as needed. Frequency may be  adjusted depending on start of care date.    Notify MD if SBP > 160 or < 90; DBP > 90 or < 50; HR > 120 or < 50; Temp > 101.      MISCELLANEOUS CARE:  Home Infusion Therapy:   SN to perform Infusion Therapy/Central Line Care.  Review Central Line Care & Central Line Flush with patient.    Administer (drug and dose): Rocephin (ceftriaxone) 1 g IV daily   Last dose given: 7/6/2018                       Home dose due: 7/7/2018  Last dose due on July 18, 2018    Scrub the Hub: Prior to accessing the line, always perform a 30 second alcohol scrub  Each lumen of the central line is to be flushed at least daily with 10 mL Normal Saline and 3 mL Heparin flush (100 units/mL)  Skilled Nurse (SN) may draw blood from IV access  Blood Draw Procedure:   - Aspirate at least 5 mL of blood   - Discard   - Obtain specimen   - Change posiflow cap   - Flush with 20 mL Normal Saline followed by a                 3-5 mL Heparin flush (100 units/mL)  Central :   - Sterile dressing changes are done weekly and as needed.   - Use chlor-hexadine scrub to cleanse site, apply Biopatch to insertion site,       apply securement device dressing   - Posi-flow caps are changed weekly and after EVERY lab draw.   - If sterile gauze is under dressing to control oozing,                 dressing change must be performed every 24 hours until gauze is not needed.  Discontinue PICC line after last dose given on July 18, 2018        Medications: Review discharge medications with patient and family and provide education.      Current Discharge Medication List      START taking these medications    Details   acetaminophen (TYLENOL) 325 MG tablet Take 2 tablets (650 mg total) by mouth every 8 (eight) hours as needed for Temperature greater than (or equal to 101 degree F).  Refills: 0      cefTRIAXone 1 g in dextrose 5 % 50 mL (ROCEPHIN) 1 g/50 mL PgBk IVPB Inject 50 mLs (1 g total) into the vein once daily. for 12 days  Qty: 12 each, Refills:  0    Last dose on July 18, 2018      ondansetron (ZOFRAN) 8 MG tablet Take 1 tablet (8 mg total) by mouth every 8 (eight) hours as needed for Nausea.  Qty: 30 tablet, Refills: 0         CONTINUE these medications which have NOT CHANGED    Details   ergocalciferol (ERGOCALCIFEROL) 50,000 unit Cap Take 1 capsule (50,000 Units total) by mouth once a week.  Qty: 4 capsule, Refills: 6      esomeprazole (NEXIUM) 40 MG capsule Take 1 capsule (40 mg total) by mouth before breakfast.  Qty: 30 capsule, Refills: 6      SYNTHROID 150 mcg tablet Take 1 tablet (150 mcg total) by mouth before breakfast.  Qty: 30 tablet, Refills: 6             I certify that this patient is confined to her home and needs intermittent skilled nursing care.

## 2018-07-06 NOTE — ASSESSMENT & PLAN NOTE
- Met criteria for sepsis with bacteremia and pyelonephritis  - Resolved with treatment  - Follow up repeat blood culture

## 2018-07-06 NOTE — SUBJECTIVE & OBJECTIVE
Interval History: She feels much better.  She would like to go home.    Review of Systems   Constitutional: Negative.  Negative for fever.   HENT: Negative.    Eyes: Negative.    Respiratory: Negative for cough, chest tightness and shortness of breath.    Cardiovascular: Negative for chest pain.   Gastrointestinal: Negative.  Negative for constipation, diarrhea and nausea.   Genitourinary: Negative for dysuria and flank pain.   Musculoskeletal: Negative.    Neurological: Negative.    Psychiatric/Behavioral: Negative.      Objective:     Temp:  [98.2 °F (36.8 °C)-98.7 °F (37.1 °C)] 98.3 °F (36.8 °C)  Pulse:  [57-71] 66  Resp:  [17-18] 18  SpO2:  [92 %-98 %] 97 %  BP: ()/(53-72) 127/72     Body mass index is 28.29 kg/m².            Drains          No matching active lines, drains, or airways          Physical Exam   Nursing note and vitals reviewed.  Constitutional: She is oriented to person, place, and time. She appears well-developed.   HENT:   Head: Normocephalic.   Eyes: Conjunctivae are normal.   Neck: Normal range of motion. No tracheal deviation present. No thyromegaly present.   Cardiovascular: Normal rate, normal heart sounds and normal pulses.    Pulmonary/Chest: Effort normal and breath sounds normal. No respiratory distress. She has no wheezes.   Abdominal: Soft. She exhibits no distension and no mass. There is no hepatosplenomegaly. There is no tenderness. There is no rebound, no guarding and no CVA tenderness. No hernia.   Musculoskeletal: Normal range of motion. She exhibits no edema or tenderness.   Lymphadenopathy:     She has no cervical adenopathy.   Neurological: She is alert and oriented to person, place, and time.   Skin: Skin is warm and dry. No rash noted. No erythema.     Psychiatric: She has a normal mood and affect. Her behavior is normal. Judgment and thought content normal.       Significant Labs:    BMP:    Recent Labs  Lab 07/04/18  0341 07/05/18  0427 07/06/18  0423    142  145   K 3.9 3.9 4.4    112* 113*   CO2 24 22* 22*   BUN 12 10 10   CREATININE 1.0 0.9 0.8   CALCIUM 8.8 8.7 9.2       CBC:     Recent Labs  Lab 07/04/18  0341 07/05/18  0427 07/06/18  0423   WBC 6.62 4.77 5.42   HGB 10.8* 9.8* 9.9*   HCT 33.3* 29.6* 30.7*    236 237       Blood Culture:   Recent Labs  Lab 07/03/18  1145 07/03/18  1153 07/04/18  0723   LABBLOO No Growth to date  No Growth to date  No Growth to date Gram stain efren bottle: Gram negative rods   Results called to and read back by: Leigh Vargas in 4w 07/04/2018    02:01  ESCHERICHIA COLI No Growth to date  No Growth to date  No Growth to date  No Growth to date  No Growth to date  No Growth to date     Urine Culture:   Recent Labs  Lab 07/03/18  1118   LABURIN ESCHERICHIA COLI>100,000 cfu/ml       Significant Imaging:

## 2018-07-06 NOTE — SUBJECTIVE & OBJECTIVE
Interval History: Pt report feeling much better, no more pain. Reports low BP at baseline.    Review of Systems   Constitutional: Negative for chills and fever.   Respiratory: Negative for shortness of breath.    Cardiovascular: Negative for leg swelling.   Gastrointestinal: Negative for abdominal pain.     Objective:     Vital Signs (Most Recent):  Temp: 98.6 °F (37 °C) (07/05/18 1938)  Pulse: 70 (07/05/18 1938)  Resp: 18 (07/05/18 1938)  BP: (!) 102/55 (07/05/18 1938)  SpO2: (!) 92 % (07/05/18 2100) Vital Signs (24h Range):  Temp:  [98.6 °F (37 °C)-100 °F (37.8 °C)] 98.6 °F (37 °C)  Pulse:  [60-71] 70  Resp:  [17-18] 18  SpO2:  [92 %-98 %] 92 %  BP: ()/(51-66) 102/55     Weight: 77.1 kg (170 lb)  Body mass index is 28.29 kg/m².    Intake/Output Summary (Last 24 hours) at 07/05/18 2135  Last data filed at 07/05/18 0700   Gross per 24 hour   Intake             1550 ml   Output                0 ml   Net             1550 ml      Physical Exam   Constitutional: She is oriented to person, place, and time. She appears well-developed and well-nourished. No distress.   HENT:   Head: Normocephalic and atraumatic.   Eyes: EOM are normal. Pupils are equal, round, and reactive to light.   Neck: Normal range of motion. Neck supple.   Cardiovascular: Normal rate and regular rhythm.    Pulmonary/Chest: Effort normal and breath sounds normal.   Abdominal: Soft. Bowel sounds are normal. She exhibits no distension. There is no tenderness.   Musculoskeletal: Normal range of motion.   Neurological: She is alert and oriented to person, place, and time.   Skin: Skin is warm and dry.   Psychiatric: She has a normal mood and affect. Her behavior is normal. Thought content normal.       Significant Labs: All pertinent labs within the past 24 hours have been reviewed.    Significant Imaging: I have reviewed and interpreted all pertinent imaging results/findings within the past 24 hours.

## 2018-07-06 NOTE — ASSESSMENT & PLAN NOTE
- Continue Rocephin  - Pt cannot tolerate Cipro which is the only PO option given bacteremia  - Will need to treat with Rocephin for 2 weeks from date of negative culture  - Plan for discharge tomorrow with PICC and home health

## 2018-07-06 NOTE — PROGRESS NOTES
Hector, RN with [employer] came to hospital and provided teaching to patient and family at bedside. Hector reported medication will be delivered to patient home this evening. [facility] [facility]  confirmed insurance is covered and patient will be admitted tomorrow.

## 2018-07-06 NOTE — PLAN OF CARE
Problem: Patient Care Overview  Goal: Plan of Care Review  Outcome: Ongoing (interventions implemented as appropriate)   07/06/18 9309   Coping/Psychosocial   Plan Of Care Reviewed With patient   Discharge instructions reviewed.Denies pain at this time. No acute distress noted. Discharge with picc line and iv abx. Reviewed infection control r/t Picc line also

## 2018-07-06 NOTE — PROCEDURES
"Mirta Perry is a 53 y.o. female patient.    Temp: 98.3 °F (36.8 °C) (07/06/18 0850)  Pulse: 66 (07/06/18 0850)  Resp: 18 (07/06/18 0850)  BP: 127/72 (07/06/18 0850)  SpO2: 97 % (07/06/18 0850)  Weight: 77.1 kg (170 lb) (07/04/18 0730)  Height: 5' 5" (165.1 cm) (07/04/18 0730)    PICC  Date/Time: 7/6/2018 11:46 AM  Performed by: LYLE MCGILL  Consent Done: Yes  Time out: Immediately prior to procedure a time out was called to verify the correct patient, procedure, equipment, support staff and site/side marked as required  Indications: med administration and vascular access  Anesthesia: local infiltration  Local anesthetic: lidocaine 1% without epinephrine  Anesthetic Total (mL): 5  Preparation: skin prepped with ChloraPrep  Skin prep agent dried: skin prep agent completely dried prior to procedure  Sterile barriers: all five maximum sterile barriers used - cap, mask, sterile gown, sterile gloves, and large sterile sheet  Hand hygiene: hand hygiene performed prior to central venous catheter insertion  Location details: right basilic  Catheter type: double lumen  Catheter size: 5 Fr  Catheter Length: 37cm    Ultrasound guidance: yes  Vessel Caliber: medium and patent, compressibility normal  Needle advanced into vessel with real time Ultrasound guidance.  Guidewire confirmed in vessel.  Sterile sheath used.  Number of attempts: 1  Post-procedure: blood return through all ports and sterile dressing applied  Estimated blood loss (mL): 1    Complications: none          Lyle Mcgill  7/6/2018    "

## 2018-07-06 NOTE — PROGRESS NOTES
Vee RN with [facility] informed SW patient responsibility for cost of IV abx will be $67.20. Vee reported nurse that will teach patient at the hospital will inform her of cost.

## 2018-07-06 NOTE — PROGRESS NOTES
SW uploaded clinicals via [facility] to [facility] to arrange home IV therapy and [facility] for home health services.

## 2018-07-06 NOTE — PLAN OF CARE
Problem: Patient Care Overview  Goal: Plan of Care Review  Outcome: Ongoing (interventions implemented as appropriate)   07/06/18 9920   Coping/Psychosocial   Plan Of Care Reviewed With patient   Pt is AAOx4. Pt c/o nausea when administering IV ABX. Prn meds given per order for nausea. Pt c/o discomfort in stomach. Pt educated about scheduled protonix po. IV ABX contiued. NS @ 125mL. Pt safety maintained:bed in lowest position, call light and personal items within reach, instructed to call for mobility. No distress noted, will continue to monitor.

## 2018-07-06 NOTE — PHYSICIAN QUERY
PT Name: Mirta Perry  MR #: [id number]    Physician Query Form - Cause and Effect Relationship Clarification      CDS/: Alexus Ware               Contact information: [phone]    This form is a permanent document in the medical record.     Query Date: July 6, 2018    By submitting this query, we are merely seeking further clarification of documentation. Please utilize your independent clinical judgment when addressing the question(s) below.    The Medical record contains the following:  Supporting Clinical Findings   Location in record    Sepsis    - Met criteria for sepsis with bacteremia and pyelonephritis  - Resolved with treatment                                                                                                                                                                                            PN 7/5      Urine and blood culture was remarkable for E.coli which was pan sensitive                                                                                                                                                                                         PN 7/5         Provider, please clarify if there is any correlation between __sepsis________ and _e coli_________________.           Are the conditions:     [X  ] Due to or associated with each other     [  ] Unrelated to each other     [  ] Other (Please Specify): _________________________

## 2018-07-06 NOTE — PROGRESS NOTES
[facility]  Urology  Progress Note    Patient Name: Mirta Perry  MRN: [id number]  Admission Date: 7/3/2018  Hospital Length of Stay: 3 days  Code Status: Full Code   Attending Provider: Ludivina Benitez MD   Primary Care Physician: Cherie Del Toro MD    Subjective:     HPI:  53wf  Left flank pain 7 days ago  Admitted with temp to 103 yesterday  Cultured and rocephin started  Ct showed 3mm left UVJ stone    Pain resolved overnight  No fever or chills this AM  No NV  Now afebrile and tachycardia has resolved  Pt now feels well although she has not seen the stone pass    Notified of consult at 812a today    Interval History: She feels much better.  She would like to go home.    Review of Systems   Constitutional: Negative.  Negative for fever.   HENT: Negative.    Eyes: Negative.    Respiratory: Negative for cough, chest tightness and shortness of breath.    Cardiovascular: Negative for chest pain.   Gastrointestinal: Negative.  Negative for constipation, diarrhea and nausea.   Genitourinary: Negative for dysuria and flank pain.   Musculoskeletal: Negative.    Neurological: Negative.    Psychiatric/Behavioral: Negative.      Objective:     Temp:  [98.2 °F (36.8 °C)-98.7 °F (37.1 °C)] 98.3 °F (36.8 °C)  Pulse:  [57-71] 66  Resp:  [17-18] 18  SpO2:  [92 %-98 %] 97 %  BP: ()/(53-72) 127/72     Body mass index is 28.29 kg/m².            Drains          No matching active lines, drains, or airways          Physical Exam   Nursing note and vitals reviewed.  Constitutional: She is oriented to person, place, and time. She appears well-developed.   HENT:   Head: Normocephalic.   Eyes: Conjunctivae are normal.   Neck: Normal range of motion. No tracheal deviation present. No thyromegaly present.   Cardiovascular: Normal rate, normal heart sounds and normal pulses.    Pulmonary/Chest: Effort normal and breath sounds normal. No respiratory distress. She has no wheezes.   Abdominal: Soft. She  exhibits no distension and no mass. There is no hepatosplenomegaly. There is no tenderness. There is no rebound, no guarding and no CVA tenderness. No hernia.   Musculoskeletal: Normal range of motion. She exhibits no edema or tenderness.   Lymphadenopathy:     She has no cervical adenopathy.   Neurological: She is alert and oriented to person, place, and time.   Skin: Skin is warm and dry. No rash noted. No erythema.     Psychiatric: She has a normal mood and affect. Her behavior is normal. Judgment and thought content normal.       Significant Labs:    BMP:    Recent Labs  Lab 07/04/18  0341 07/05/18  0427 07/06/18  0423    142 145   K 3.9 3.9 4.4    112* 113*   CO2 24 22* 22*   BUN 12 10 10   CREATININE 1.0 0.9 0.8   CALCIUM 8.8 8.7 9.2       CBC:     Recent Labs  Lab 07/04/18  0341 07/05/18  0427 07/06/18  0423   WBC 6.62 4.77 5.42   HGB 10.8* 9.8* 9.9*   HCT 33.3* 29.6* 30.7*    236 237       Blood Culture:   Recent Labs  Lab 07/03/18  1145 07/03/18  1153 07/04/18  0723   LABBLOO No Growth to date  No Growth to date  No Growth to date Gram stain efren bottle: Gram negative rods   Results called to and read back by: Leigh Vargas in 4w 07/04/2018    02:01  ESCHERICHIA COLI No Growth to date  No Growth to date  No Growth to date  No Growth to date  No Growth to date  No Growth to date     Urine Culture:   Recent Labs  Lab 07/03/18  1118   LABURIN ESCHERICHIA COLI>100,000 cfu/ml       Significant Imaging:                    Assessment/Plan:     * Pyelonephritis, acute    Okay to d/c home with 2 weeks of antibiotics  Bactrim or Augmentin would be reasonable    Follow up with Dr. Nicolas in 2-3 weeks            VTE Risk Mitigation         Ordered     IP VTE LOW RISK PATIENT  Once      07/03/18 1604     Place KELLY hose  Until discontinued      07/03/18 1604     Place sequential compression device  Until discontinued      07/03/18 1604          W Chidi Summers MD  Urology  [facility]

## 2018-07-06 NOTE — PLAN OF CARE
Problem: Patient Care Overview  Goal: Plan of Care Review  Outcome: Ongoing (interventions implemented as appropriate)   07/06/18 1521   Coping/Psychosocial   Plan Of Care Reviewed With patient   Discharge instructions reviewed. Denies pain at this time.No acute distress noted

## 2018-07-06 NOTE — PROGRESS NOTES
EDGARDO contacted [facility] @ [phone] to schedule urology follow-up, EDGARDO spoke to Sparkle, patient will see DARRYN Ferro on 7/20/18 @ 9:00am.

## 2018-07-08 LAB — BACTERIA BLD CULT: NORMAL

## 2018-07-08 NOTE — DISCHARGE SUMMARY
"[facility] Medicine  Discharge Summary      Patient Name: Mirta Perry  MRN: [id number]  Admission Date: 7/3/2018  Hospital Length of Stay: 3 days  Discharge Date and Time: 7/6/2018  4:01 PM  Attending Physician: Ludivina Benitez MD  Discharging Provider: Ludivina Benitez MD  Primary Care Provider: Cherie Del Toro MD      HPI:   54 y/o female with uterine fibroids, hx of kidney stones, hypothyroidism,and hysterectomy presents to the ED c/o x5 day hx of L sided flank pain that now radiates to LLQ abdomen. The pain is severe (9/10) and worse with palpation and urination. She reports a "pulsating" pain with urination. The patient states "it feels like a kidney stone that I got 30 yrs ago." The patient also reports subjective fever, chills, nausea, diarrhea, and dysuria. The patient attempted AZO with slight relief. The patient reports hematuria x5 days ago that resolved after taking AZO. The patient denies emesis, vaginal d/c, or vaginal bleeding.patioent is septic with fever 103,tachycadia 136,source acute pyelonephritis,CT show 3 mm urethral obstruction with hydronephrosis and hydroureter,she has left CVA tenderness as well,cultures has been done and she has been started on IV Abx and IVF.    * No surgery found *      Hospital Course:   Ms. Perry was admitted with sepsis due to acute left pyelonephritis. Workup with CT with stone protocol showed 3 mm urethral obstruction with hydronephrosis and hydroureter and she had left CVA tenderness. Pt was treated empirically with IVF, Rocephin and urine and blood cultures were obtained. Urology was consulted and she underwent further evaluation with retroperitoneal U/S which showed resolution of left sided hydronephrosis and left CVA pain/tenderness resolved. Urine and blood culture was remarkable for E.coli which was pan sensitive. Repeat blood culture were obtained on 7/4/2018 which were NGTD. Treatment options (antibiotics) were " discussed with the patient, and initially the plan was to convert to Cipro but patient reported severe reaction with N/V/diarrhea with prolonged symptoms, therefore, we discussed treatment with IV Rocephin with PICC line placement which she was agreeable to since there was no other viable PO antibiotic option due to bacteremia. Home health was arranged on discharge for the patient to complete 14 day course total from date of negative blood culture with Rocephin. Pt arranged for follow up with PCP and Urology.    Antibiotic Regimen:  Rocephin (ceftriaxone) 1 g IV daily   Last dose due on July 18, 2018     Consults:   Consults         Status Ordering Provider     Inpatient consult to Urology  Once     Provider:  MD Devin Jimenez VERNE A.        Service: Hospital Medicine    Final Active Diagnoses:    Diagnosis Date Noted POA    PRINCIPAL PROBLEM:  Pyelonephritis, acute [N10] 07/03/2018 Yes    Hypothyroidism, acquired [E03.9]  Yes      Problems Resolved During this Admission:    Diagnosis Date Noted Date Resolved POA    Sepsis [A41.9] 07/03/2018 07/06/2018 Yes    Hydronephrosis with urinary obstruction due to ureteral calculus [N13.2] 07/03/2018 07/06/2018 Yes    Hydroureter on left [N13.4] 07/03/2018 07/06/2018 Yes    Bacteremia due to Gram-negative bacteria [R78.81] 07/04/2018 07/06/2018 Yes       Discharged Condition: good    Disposition: Home-Health Care [facility]    Follow Up:  Follow-up Information     Cherie Del Toro MD. Schedule an appointment as soon as possible for a visit in 1 week.    Specialty:  Internal Medicine  Why:  Outpatient Services, PCP Follow-up Appointment  Contact information:  [address] [place] [address]  [phone]             Susanne Nicolas MD. Go on 7/20/2018.    Specialty:  Urology  Why:  Outpatient Services, Urology Follow-up Appointment, Please arrive to clinic for 9:00AM, You will see DARRYN Ferro  Contact  information:  [address]  [address]  [place] [address]  [phone]             [facility].    Specialties:  Pharmacist, DME Provider, IV Infusion  Why:  Infusion Provider (Home IV Abx)  Contact information:  [address] [place] [address]  [phone]             [facility] [employer].    Specialty:  Home Health Services  Why:  Home Health Provider  Contact information:  [address]  [address]  [place] [id number] [phone]                 Patient Instructions:     Diet Adult Regular     Activity as tolerated       Significant Diagnostic Studies: Urine and blood culture E.coli    Pending Diagnostic Studies:     None         Medications:  Reconciled Home Medications:      Medication List      START taking these medications    acetaminophen 325 MG tablet  Commonly known as:  TYLENOL  Take 2 tablets (650 mg total) by mouth every 8 (eight) hours as needed for Temperature greater than (or equal to 101 degree F).     cefTRIAXone 1 g in dextrose 5 % 50 mL 1 g/50 mL Pgbk IVPB  Commonly known as:  ROCEPHIN  Inject 50 mLs (1 g total) into the vein once daily. for 12 days     ondansetron 8 MG tablet  Commonly known as:  ZOFRAN  Take 1 tablet (8 mg total) by mouth every 8 (eight) hours as needed for Nausea.        CONTINUE taking these medications    ergocalciferol 50,000 unit Cap  Commonly known as:  ERGOCALCIFEROL  Take 1 capsule (50,000 Units total) by mouth once a week.     esomeprazole 40 MG capsule  Commonly known as:  NexIUM  Take 1 capsule (40 mg total) by mouth before breakfast.     SYNTHROID 150 MCG tablet  Generic drug:  levothyroxine  Take 1 tablet (150 mcg total) by mouth before breakfast.            Indwelling Lines/Drains at time of discharge:   Lines/Drains/Airways     Peripherally Inserted Central Catheter Line                 PICC Double Lumen 07/06/18 1145 right basilic 1 day                Time spent on the discharge of patient: 40  minutes  Patient was seen and examined on the date of discharge and determined to be suitable for discharge.         Ludivina Benitez MD  Department of Hospital Medicine  [facility]

## 2018-07-09 LAB
BACTERIA BLD CULT: NORMAL
BACTERIA BLD CULT: NORMAL

## 2018-07-10 ENCOUNTER — NURSE TRIAGE (OUTPATIENT)
Dept: ADMINISTRATIVE | Facility: CLINIC | Age: 53
End: 2018-07-10

## 2018-07-10 ENCOUNTER — HOSPITAL ENCOUNTER (EMERGENCY)
Facility: HOSPITAL | Age: 53
Discharge: HOME | End: 2018-07-11
Attending: EMERGENCY MEDICINE
Payer: COMMERCIAL

## 2018-07-10 DIAGNOSIS — J02.9 PHARYNGITIS, UNSPECIFIED ETIOLOGY: ICD-10-CM

## 2018-07-10 DIAGNOSIS — R59.0 CERVICAL LYMPHADENOPATHY: Primary | ICD-10-CM

## 2018-07-10 LAB
ALBUMIN SERPL BCP-MCNC: 3.5 G/DL
ALP SERPL-CCNC: 105 U/L
ALT SERPL W/O P-5'-P-CCNC: 40 U/L
ANION GAP SERPL CALC-SCNC: 9 MMOL/L
AST SERPL-CCNC: 36 U/L
BASOPHILS # BLD AUTO: 0.03 K/UL
BASOPHILS NFR BLD: 0.3 %
BILIRUB SERPL-MCNC: 0.2 MG/DL
BILIRUB UR QL STRIP: NEGATIVE
BUN SERPL-MCNC: 21 MG/DL
CALCIUM SERPL-MCNC: 9.8 MG/DL
CHLORIDE SERPL-SCNC: 107 MMOL/L
CLARITY UR: CLEAR
CO2 SERPL-SCNC: 27 MMOL/L
COLOR UR: YELLOW
CREAT SERPL-MCNC: 1 MG/DL
DIFFERENTIAL METHOD: ABNORMAL
EOSINOPHIL # BLD AUTO: 0.2 K/UL
EOSINOPHIL NFR BLD: 2.5 %
ERYTHROCYTE [DISTWIDTH] IN BLOOD BY AUTOMATED COUNT: 12.3 %
EST. GFR  (AFRICAN AMERICAN): >60 ML/MIN/1.73 M^2
EST. GFR  (NON AFRICAN AMERICAN): >60 ML/MIN/1.73 M^2
GLUCOSE SERPL-MCNC: 89 MG/DL
GLUCOSE UR QL STRIP: NEGATIVE
HCT VFR BLD AUTO: 34.6 %
HGB BLD-MCNC: 11.2 G/DL
HGB UR QL STRIP: NEGATIVE
KETONES UR QL STRIP: NEGATIVE
LACTATE SERPL-SCNC: 0.8 MMOL/L
LEUKOCYTE ESTERASE UR QL STRIP: ABNORMAL
LYMPHOCYTES # BLD AUTO: 2.3 K/UL
LYMPHOCYTES NFR BLD: 27.1 %
MCH RBC QN AUTO: 28.8 PG
MCHC RBC AUTO-ENTMCNC: 32.4 G/DL
MCV RBC AUTO: 89 FL
MICROSCOPIC COMMENT: NORMAL
MONOCYTES # BLD AUTO: 0.6 K/UL
MONOCYTES NFR BLD: 7.1 %
NEUTROPHILS # BLD AUTO: 5.4 K/UL
NEUTROPHILS NFR BLD: 63 %
NITRITE UR QL STRIP: NEGATIVE
PH UR STRIP: 5 [PH] (ref 5–8)
PLATELET # BLD AUTO: 472 K/UL
PMV BLD AUTO: 9.2 FL
POTASSIUM SERPL-SCNC: 4.2 MMOL/L
PROT SERPL-MCNC: 7.2 G/DL
PROT UR QL STRIP: NEGATIVE
RBC # BLD AUTO: 3.89 M/UL
RBC #/AREA URNS HPF: 1 /HPF (ref 0–4)
SODIUM SERPL-SCNC: 143 MMOL/L
SP GR UR STRIP: 1.02 (ref 1–1.03)
SQUAMOUS #/AREA URNS HPF: NORMAL /HPF
URN SPEC COLLECT METH UR: ABNORMAL
UROBILINOGEN UR STRIP-ACNC: NEGATIVE EU/DL
WBC # BLD AUTO: 8.63 K/UL
WBC #/AREA URNS HPF: 5 /HPF (ref 0–5)

## 2018-07-10 PROCEDURE — 87040 BLOOD CULTURE FOR BACTERIA: CPT | Mod: 59

## 2018-07-10 PROCEDURE — 80053 COMPREHEN METABOLIC PANEL: CPT

## 2018-07-10 PROCEDURE — 99283 EMERGENCY DEPT VISIT LOW MDM: CPT

## 2018-07-10 PROCEDURE — 86308 HETEROPHILE ANTIBODY SCREEN: CPT

## 2018-07-10 PROCEDURE — 81000 URINALYSIS NONAUTO W/SCOPE: CPT

## 2018-07-10 PROCEDURE — 85025 COMPLETE CBC W/AUTO DIFF WBC: CPT

## 2018-07-10 PROCEDURE — 83605 ASSAY OF LACTIC ACID: CPT

## 2018-07-11 VITALS
BODY MASS INDEX: 27.49 KG/M2 | WEIGHT: 165 LBS | TEMPERATURE: 98 F | HEIGHT: 65 IN | RESPIRATION RATE: 16 BRPM | SYSTOLIC BLOOD PRESSURE: 99 MMHG | HEART RATE: 62 BPM | OXYGEN SATURATION: 95 % | DIASTOLIC BLOOD PRESSURE: 57 MMHG

## 2018-07-11 LAB
DEPRECATED S PYO AG THROAT QL EIA: NEGATIVE
HETEROPH AB SERPL QL IA: NEGATIVE

## 2018-07-11 PROCEDURE — 87880 STREP A ASSAY W/OPTIC: CPT

## 2018-07-11 PROCEDURE — 87081 CULTURE SCREEN ONLY: CPT

## 2018-07-11 NOTE — TELEPHONE ENCOUNTER
"  Reason for Disposition   Rapid increase in size of node over several hours    Answer Assessment - Initial Assessment Questions  1. LOCATION: "Where is the swollen node located?" "Is the matching node on the other side of the body also swollen?"      Neck bilaterally   2. SIZE: "How big is the node?" (Inches or centimeters) (or compare to common objects such as pea, bean, marble, golf ball)       [place] size  3. ONSET: "When did the swelling start?"       Today   4. NECK NODES: "Is there a sore throat, runny nose or other symptoms of a cold?"       Sore throat  5. GROIN OR ARMPIT NODES: "Is there a sore, scratch, cut or painful red area on that arm or leg?"       no  6. FEVER: "Do you have a fever?" If so, ask: "What is it, how was it measured, and when did it start?"       no  7. CAUSE: "What do you think is causing the swollen lymph nodes?"      Not sure  8. OTHER SYMPTOMS: "Do you have any other symptoms?"      n/a  9. PREGNANCY: "Is there any chance you are pregnant?" "When was your last menstrual period?"      N/a  Pt recently discharged from hospital on rocephin IV - dx sepsis.    Protocols used: ST LYMPH NODES [id number]-C-OD      Advised Dr SANGITA Hernandez - he agreed she should be seen in ER this pm.  was advised.   "

## 2018-07-11 NOTE — DISCHARGE INSTRUCTIONS
You were seen in the emergency department for sore throat and enlarged lymph nodes.  Your exam and labs are reassuring.  He did not have signs of a severe infection.  We think you're safe to go home.  Please follow-up with your primary care provider this week.  Please return for any new or worsening pain, difficulty swallowing, drooling, severe fever, nausea, vomiting, difficulty breathing, rash, hives, dizziness, worsening pain, or you become concerned in any other way.

## 2018-07-11 NOTE — ED PROVIDER NOTES
Encounter Date: 7/10/2018  This is a SORT/MSE of a 53 y.o. female presenting to the ED with c/o swollen and tender glands in the neck that she noticed today. She called on call nurse and was advised to report to ED for further evaluation.  She was recently DC from hospital on Friday and is receiving IV antibiotics for urosepsis. SAHIL PICC line intact. Care will be transferred to an alternate provider when patient is roomed for a full evaluation and final disposition. Patient is aware that he/she is awaiting a room in the emergency department, where another provider will review results, evaluate and treat as needed. CITLALI Greco, MOIRA  SCRIBE #1 NOTE: I, Levon Draper am scribing for, and in the presence of,  Andres Joseph MD. I have scribed the following portions of the note - Other sections scribed: HPI, ROS, PE.       History     Chief Complaint   Patient presents with    Lymphadenopathy     PATIENT REPORTS SHE WAS RECENTLY HOSPITALIZED WITH SEPSIS ON TUESDAY MORNING D/T KIDNEY STONE. PATIENT AOX4, IN NO ACUTE DISTRESS. RIGHT PICC IN PLACE FOR ABX.     CC: Lymphadenopathy    HPI: This is a 53 y.o. F who has Hypothyroidism, Acute pyelonephritis, Uterine fibroids, Ventral hernia s/p cholectectomy, and Vitamin D deficiency who presents to the ED c/o acute swollen and tender glands in the neck bilaterally that began today. Pt also complains of acute generalized weakness. She was instructed to report to the ED by an on call nurse because she was recently discharged from the hospital 4 days ago after being treated for urosepsis. Pt has a PICC line in place and is currently on Rocephin. Pt denies fever, cough, difficulty swallowing, SOB, nausea, vomiting, abdominal pain, diarrhea, constipation, rash, tobacco use, alcohol use, or recreational drug use.      The history is provided by the patient. No  was used.     Review of patient's allergies indicates:   Allergen Reactions    Ciprofloxacin      Percocet  [oxycodone-acetaminophen]      Other reaction(s): Hives     Past Medical History:   Diagnosis Date    Acute pyelonephritis 07/03/2018    Fatigue     Fibroids     uterine    Hernia, ventral     s/p cholectectomy    Hypothyroidism, acquired     Migraines     Vitamin D deficiency     Weight gain      Past Surgical History:   Procedure Laterality Date    APPENDECTOMY Bilateral     tummy tuck    BREAST SURGERY Bilateral     augmentation    CARPAL TUNNEL RELEASE Right 05/2014    CHOLECYSTECTOMY      laparoscopic    gastric sleeve      meniscal surgery  May 2014    left knee    rotator cuff surgery Right     TUBAL LIGATION      bitateral     Family History   Problem Relation Age of Onset    Diabetes Mellitus Mother     Thyroid cancer Father         daughter     Parkinsonism Father     Heart disease Father     Breast cancer Unknown         both sides     Social History   Substance Use Topics    Smoking status: Never Smoker    Smokeless tobacco: Never Used    Alcohol use No     Review of Systems   Constitutional: Negative for chills and fever.   HENT: Negative for ear pain, sore throat and trouble swallowing.    Eyes: Negative for pain.   Respiratory: Negative for cough and shortness of breath.    Cardiovascular: Negative for chest pain.   Gastrointestinal: Negative for abdominal pain, constipation, diarrhea, nausea and vomiting.   Genitourinary: Negative for dysuria.   Musculoskeletal: Negative for back pain.        (+) Mild tenderness in anterior aspect of neck   Skin: Negative for rash.   Neurological: Positive for weakness (generalized). Negative for headaches.   Psychiatric/Behavioral: Negative for confusion.       Physical Exam     Initial Vitals [07/10/18 2210]   BP Pulse Resp Temp SpO2   126/72 73 16 98.3 °F (36.8 °C) 96 %      MAP       --         Physical Exam    Nursing note and vitals reviewed.  Constitutional: She appears well-developed and well-nourished. She is not  diaphoretic. No distress.   HENT:   Head: Normocephalic and atraumatic.   Mouth/Throat: Oropharynx is clear and moist and mucous membranes are normal. No oropharyngeal exudate, posterior oropharyngeal edema or posterior oropharyngeal erythema.   (+) TTP to lymph nodes   Eyes: Conjunctivae and EOM are normal. Pupils are equal, round, and reactive to light. Right eye exhibits no discharge. Left eye exhibits no discharge. No scleral icterus.   Neck: Normal range of motion. Neck supple. No thyromegaly present. No tracheal deviation present. No JVD present.   Cardiovascular: Normal rate and regular rhythm.   Pulmonary/Chest: Breath sounds normal. No stridor. No respiratory distress. She has no wheezes. She has no rhonchi. She has no rales. She exhibits no tenderness.   Abdominal: Soft. Normal appearance and bowel sounds are normal. She exhibits no distension. There is no tenderness. There is no rebound and no guarding.   Musculoskeletal: Normal range of motion. She exhibits no edema or tenderness.   Lymphadenopathy:     She has cervical adenopathy.   Neurological: She is alert and oriented to person, place, and time.   Skin: Skin is warm and dry.   Psychiatric: She has a normal mood and affect. Her behavior is normal.         ED Course   Procedures  Labs Reviewed   URINALYSIS, REFLEX TO URINE CULTURE - Abnormal; Notable for the following:        Result Value    Leukocytes, UA Trace (*)     All other components within normal limits    Narrative:     Preferred Collection Type->Urine, Clean Catch   CBC W/ AUTO DIFFERENTIAL - Abnormal; Notable for the following:     RBC 3.89 (*)     Hemoglobin 11.2 (*)     Hematocrit 34.6 (*)     Platelets 472 (*)     All other components within normal limits   COMPREHENSIVE METABOLIC PANEL - Abnormal; Notable for the following:     BUN, Bld 21 (*)     All other components within normal limits   THROAT SCREEN, RAPID   CULTURE, BLOOD   CULTURE, BLOOD   CULTURE, STREP A,  THROAT   LACTIC ACID,  PLASMA   URINALYSIS MICROSCOPIC    Narrative:     Preferred Collection Type->Urine, Clean Catch   HETEROPHILE AB SCREEN          Imaging Results    None          Medical Decision Making:   Initial Assessment:   53-year-old female with history of pyelonephritis and sepsis presenting with lymphadenopathy in neck pain times one day.  Patient denies fevers, chills, nausea, vomiting, difficulty breathing, difficulty swallowing.  On exam, patient is in no acute distress.  She has slightly enlarged cervical lymph nodes.  Oropharynx is clear though her tonsils are somewhat obscured by her tongue.  No severe purulance able to be identified.  No skin rashes, nausea, vomiting, tachycardia, or hypotension.  Overall my concern for ALLERGIC reaction to her recently started ceftriaxone is low.  Pharyngitis of viral origin likely, strep not ruled out.  We'll get Monospot, and rapid strep.  Patient is otherwise well-appearing.  Labs ordered from triage show a mild anemia at baseline, without any leukocytosis, elevated lactate, or other evidence of sepsis or acidemia.  If the strep test is negative, I believe she will be safe for discharge home.  I have advised primary care follow-up as well as strict return precautions.  She has no other evidence of anaphylaxis or other ALLERGIC reaction.            Scribe Attestation:   Scribe #1: I performed the above scribed service and the documentation accurately describes the services I performed. I attest to the accuracy of the note.    Attending Attestation:           Physician Attestation for Scribe:  Physician Attestation Statement for Scribe #1: I, Andres Joseph MD, reviewed documentation, as scribed by Levon Draper in my presence, and it is both accurate and complete.                    Clinical Impression:   The primary encounter diagnosis was Cervical lymphadenopathy. A diagnosis of Pharyngitis, unspecified etiology was also pertinent to this visit.      Disposition:    Disposition: Discharged  Condition: Stable                        Andres Joseph MD  07/11/18 7808

## 2018-07-11 NOTE — ED TRIAGE NOTES
Pt presents to ED with c/o swollen glands under chin that are tender to touch and sore throat. that started this morning. Also having general weakness since waking up this morning. Denies fever. On IV rocephin daily.  Reports calling on-call nurse today who told her to report to ER. Recently had hospital admission for sepsis

## 2018-07-13 LAB — BACTERIA THROAT CULT: NORMAL

## 2018-07-16 LAB
BACTERIA BLD CULT: NORMAL
BACTERIA BLD CULT: NORMAL

## 2018-08-30 ENCOUNTER — TELEPHONE (OUTPATIENT)
Dept: ADMINISTRATIVE | Facility: CLINIC | Age: 53
End: 2018-08-30

## 2018-08-30 NOTE — PROGRESS NOTES
Home Health SOC with [employer], [facility] [employer]. Dr. Ludivina Benitez.  services.

## 2019-07-09 ENCOUNTER — OFFICE VISIT (OUTPATIENT)
Dept: FAMILY MEDICINE | Facility: CLINIC | Age: 54
End: 2019-07-09
Payer: COMMERCIAL

## 2019-07-09 VITALS
BODY MASS INDEX: 31.67 KG/M2 | HEIGHT: 65 IN | DIASTOLIC BLOOD PRESSURE: 70 MMHG | WEIGHT: 190.06 LBS | OXYGEN SATURATION: 97 % | HEART RATE: 70 BPM | RESPIRATION RATE: 17 BRPM | TEMPERATURE: 99 F | SYSTOLIC BLOOD PRESSURE: 120 MMHG

## 2019-07-09 DIAGNOSIS — Z00.00 HEALTHCARE MAINTENANCE: Primary | ICD-10-CM

## 2019-07-09 DIAGNOSIS — E03.9 HYPOTHYROIDISM, UNSPECIFIED TYPE: ICD-10-CM

## 2019-07-09 DIAGNOSIS — R53.83 FATIGUE, UNSPECIFIED TYPE: ICD-10-CM

## 2019-07-09 PROCEDURE — 99386 PREV VISIT NEW AGE 40-64: CPT | Mod: 25,S$GLB,, | Performed by: INTERNAL MEDICINE

## 2019-07-09 PROCEDURE — 99999 PR PBB SHADOW E&M-EST. PATIENT-LVL IV: CPT | Mod: PBBFAC,,, | Performed by: INTERNAL MEDICINE

## 2019-07-09 PROCEDURE — 99386 PR PREVENTIVE VISIT,NEW,40-64: ICD-10-PCS | Mod: 25,S$GLB,, | Performed by: INTERNAL MEDICINE

## 2019-07-09 PROCEDURE — 90715 TDAP VACCINE GREATER THAN OR EQUAL TO 7YO IM: ICD-10-PCS | Mod: S$GLB,,, | Performed by: INTERNAL MEDICINE

## 2019-07-09 PROCEDURE — 90471 TDAP VACCINE GREATER THAN OR EQUAL TO 7YO IM: ICD-10-PCS | Mod: S$GLB,,, | Performed by: INTERNAL MEDICINE

## 2019-07-09 PROCEDURE — 90471 IMMUNIZATION ADMIN: CPT | Mod: S$GLB,,, | Performed by: INTERNAL MEDICINE

## 2019-07-09 PROCEDURE — 99999 PR PBB SHADOW E&M-EST. PATIENT-LVL IV: ICD-10-PCS | Mod: PBBFAC,,, | Performed by: INTERNAL MEDICINE

## 2019-07-09 PROCEDURE — 90715 TDAP VACCINE 7 YRS/> IM: CPT | Mod: S$GLB,,, | Performed by: INTERNAL MEDICINE

## 2019-07-09 RX ORDER — AMOXICILLIN 500 MG/1
CAPSULE ORAL
Refills: 0 | COMMUNITY
Start: 2019-04-24 | End: 2019-11-18

## 2019-07-09 RX ORDER — TRAMADOL HYDROCHLORIDE 50 MG/1
TABLET ORAL
Refills: 0 | COMMUNITY
Start: 2019-04-24 | End: 2019-11-18

## 2019-07-09 RX ORDER — CHLORHEXIDINE GLUCONATE ORAL RINSE 1.2 MG/ML
SOLUTION DENTAL
Refills: 0 | COMMUNITY
Start: 2019-04-24 | End: 2021-11-02 | Stop reason: ALTCHOICE

## 2019-07-09 RX ORDER — LEVOTHYROXINE SODIUM 125 UG/1
TABLET ORAL
Qty: 30 TABLET | Refills: 5 | Status: SHIPPED | OUTPATIENT
Start: 2019-07-09 | End: 2019-11-18 | Stop reason: SDUPTHER

## 2019-07-09 RX ORDER — IBUPROFEN 800 MG/1
TABLET ORAL
Refills: 0 | COMMUNITY
Start: 2019-04-24 | End: 2021-11-02

## 2019-07-09 RX ORDER — LEVOTHYROXINE SODIUM 125 UG/1
TABLET ORAL
Refills: 1 | COMMUNITY
Start: 2019-06-06 | End: 2019-07-09 | Stop reason: ALTCHOICE

## 2019-07-09 RX ORDER — PROGESTERONE 50 MG/ML
INJECTION, SOLUTION INTRAMUSCULAR
Refills: 0 | COMMUNITY
Start: 2019-04-08 | End: 2019-11-18

## 2019-07-09 NOTE — PROGRESS NOTES
HISTORY OF PRESENT ILLNESS:  Mirta Perry is a 54 y.o. female with migraines, hypothyroid who presents to the clinic today for Establish Care    She is overall doing well.  Transitioning from prior PCP Dr. Del Toro.    Saw ob-gyn for [facility] 3/19/19 Dr. Mills-Fausto [facility].  Reports she is perimenopausal.  Was getting cream compounded from [facility] through her old PCP Dr. Del Toro.  Reports it did not work.    Mammogram due  Colonoscopy due    Has upcoming trip to [facility], expecting a new grandbaby and in process of building a new home in [place].    PAST MEDICAL HISTORY:  Past Medical History:   Diagnosis Date    Acute pyelonephritis 07/03/2018    Fatigue     Fibroids     uterine    Hernia, ventral     s/p cholectectomy    Hypothyroidism, acquired     Migraines     Vitamin D deficiency     Weight gain        PAST SURGICAL HISTORY:  Past Surgical History:   Procedure Laterality Date    APPENDECTOMY Bilateral     tummy tuck    BREAST SURGERY Bilateral     augmentation    CARPAL TUNNEL RELEASE Right 05/2014    CHOLECYSTECTOMY      laparoscopic    gastric sleeve      meniscal surgery  May 2014    left knee    rotator cuff surgery Right     TUBAL LIGATION      bitateral       SOCIAL HISTORY:  Social History     Socioeconomic History    Marital status:      Spouse name: michael    Number of children: 4    Years of education: college    Highest education level: Not on file   Occupational History    Occupation:      Employer: [employer]   Social Needs    Financial resource strain: Not on file    Food insecurity:     Worry: Not on file     Inability: Not on file    Transportation needs:     Medical: Not on file     Non-medical: Not on file   Tobacco Use    Smoking status: Never Smoker    Smokeless tobacco: Never Used   Substance and Sexual Activity    Alcohol use: No    Drug use: No    Sexual activity: Yes     Partners: Male    Lifestyle    Physical activity:     Days per week: Not on file     Minutes per session: Not on file    Stress: Not on file   Relationships    Social connections:     Talks on phone: Not on file     Gets together: Not on file     Attends [employer] service: Not on file     Active member of club or organization: Not on file     Attends meetings of clubs or organizations: Not on file     Relationship status: Not on file   Other Topics Concern    Not on file   Social History Narrative    Adult Screenings updated and reviewed  6/5/14    Mammogram( for females) overdue     Pap ( for females)overdue    Colonoscopy age  49- not done yet     Flu shot yearly not done for 2013     Td ?    Pneumovax recommended one time  at age  64    Zostavax recommended one time at  age  59    Eye exam 2 years Bone density - not done          U/s thyroid  4/24/14       FAMILY HISTORY:  Family History   Problem Relation Age of Onset    Diabetes Mellitus Mother     Thyroid cancer Father         daughter     Parkinsonism Father     Heart disease Father     Breast cancer Unknown         both sides       ALLERGIES AND MEDICATIONS: updated and reviewed.  Review of patient's allergies indicates:   Allergen Reactions    Ciprofloxacin     Percocet  [oxycodone-acetaminophen]      Other reaction(s): Hives     Medication List with Changes/Refills   Current Medications    ACETAMINOPHEN (TYLENOL) 325 MG TABLET    Take 2 tablets (650 mg total) by mouth every 8 (eight) hours as needed for Temperature greater than (or equal to 101 degree F).    ERGOCALCIFEROL (ERGOCALCIFEROL) 50,000 UNIT CAP    Take 1 capsule (50,000 Units total) by mouth once a week.    ESOMEPRAZOLE (NEXIUM) 40 MG CAPSULE    Take 1 capsule (40 mg total) by mouth before breakfast.    ONDANSETRON (ZOFRAN) 8 MG TABLET    Take 1 tablet (8 mg total) by mouth every 8 (eight) hours as needed for Nausea.    SYNTHROID 150 MCG TABLET    Take 1 tablet (150 mcg total) by mouth before  breakfast.          CARE TEAM:  Patient Care Team:  Cherie Del Toro MD as PCP - General (Internal Medicine)         REVIEW OF SYSTEMS:  Review of Systems   Constitutional: Negative for activity change and unexpected weight change.   HENT: Negative for hearing loss, rhinorrhea and trouble swallowing.    Eyes: Negative for discharge and visual disturbance.   Respiratory: Negative for chest tightness and wheezing.    Cardiovascular: Negative for chest pain and palpitations.   Gastrointestinal: Negative for blood in stool, constipation, diarrhea and vomiting.   Endocrine: Negative for polydipsia and polyuria.   Genitourinary: Negative for difficulty urinating, dysuria, hematuria and menstrual problem.   Musculoskeletal: Negative for arthralgias, joint swelling and neck pain.   Neurological: Negative for weakness and headaches.   Psychiatric/Behavioral: Negative for confusion and dysphoric mood.         PHYSICAL EXAM:   Vitals:    07/09/19 0854   BP: 120/70   Pulse: 70   Resp: 17   Temp: 98.6 °F (37 °C)             Body mass index is 31.62 kg/m².     General appearance - alert, well appearing, and in no distress  Mental status - normal mood, behavior, speech, dress, motor activity, and thought processes  Eyes - sclera anicteric, left eye normal, right eye normal  Chest - clear to auscultation, no wheezes, rales or rhonchi, symmetric air entry, no tachypnea, retractions or cyanosis  Heart - normal rate, regular rhythm, normal S1, S2, no murmurs, rubs, clicks or gallops  Neurological - alert, oriented, normal speech, no focal findings or movement disorder noted, motor and sensory grossly normal bilaterally  Musculoskeletal - no joint tenderness, deformity or swelling  Extremities - peripheral pulses normal, no pedal edema, no clubbing or cyanosis      ASSESSMENT AND PLAN:  1. Healthcare maintenance  - labs in November  - Case request GI: COLONOSCOPY  - Mammo Digital Screening Bilat; Future  - Hepatitis C  antibody; Future  - (In Office Administered) Tdap Vaccine  - Hemoglobin A1c; Future  - Comprehensive metabolic panel; Future  - Lipid panel; Future  - CBC auto differential; Future  - TSH; Future  - Vitamin D; Future  - T4, free; Future  - Varicella zoster antibody, IgG; Future    2. Fatigue, unspecified type  3. Hypothyroidism, unspecified type  - TSH; Future  - T4, free; Future  - SYNTHROID 125 mcg tablet; Take one tablet by mouth daily on empty stomach apart from other meds  Dispense: 30 tablet; Refill: 5       Follow up in November or sooner as needed.

## 2019-07-19 DIAGNOSIS — Z12.11 COLON CANCER SCREENING: ICD-10-CM

## 2019-09-17 DIAGNOSIS — Z12.11 COLON CANCER SCREENING: Primary | ICD-10-CM

## 2019-10-14 PROBLEM — Z00.00 HEALTHCARE MAINTENANCE: Status: RESOLVED | Noted: 2019-07-09 | Resolved: 2019-10-14

## 2019-11-04 ENCOUNTER — PATIENT OUTREACH (OUTPATIENT)
Dept: ADMINISTRATIVE | Facility: HOSPITAL | Age: 54
End: 2019-11-04

## 2019-11-04 ENCOUNTER — HOSPITAL ENCOUNTER (OUTPATIENT)
Dept: RADIOLOGY | Facility: HOSPITAL | Age: 54
Discharge: HOME | End: 2019-11-04
Attending: INTERNAL MEDICINE
Payer: COMMERCIAL

## 2019-11-04 DIAGNOSIS — Z00.00 HEALTHCARE MAINTENANCE: ICD-10-CM

## 2019-11-04 DIAGNOSIS — Z12.31 ENCOUNTER FOR SCREENING MAMMOGRAM FOR BREAST CANCER: ICD-10-CM

## 2019-11-04 PROCEDURE — 77063 MAMMO DIGITAL SCREENING BILAT WITH TOMOSYNTHESIS_CAD: ICD-10-PCS | Mod: 26,,, | Performed by: RADIOLOGY

## 2019-11-04 PROCEDURE — 77067 MAMMO DIGITAL SCREENING BILAT WITH TOMOSYNTHESIS_CAD: ICD-10-PCS | Mod: 26,,, | Performed by: RADIOLOGY

## 2019-11-04 PROCEDURE — 77067 SCR MAMMO BI INCL CAD: CPT | Mod: 26,,, | Performed by: RADIOLOGY

## 2019-11-04 PROCEDURE — 77067 SCR MAMMO BI INCL CAD: CPT | Mod: TC

## 2019-11-04 PROCEDURE — 77063 BREAST TOMOSYNTHESIS BI: CPT | Mod: 26,,, | Performed by: RADIOLOGY

## 2019-11-08 ENCOUNTER — TELEPHONE (OUTPATIENT)
Dept: FAMILY MEDICINE | Facility: CLINIC | Age: 54
End: 2019-11-08

## 2019-11-08 DIAGNOSIS — E87.0 SERUM SODIUM ELEVATED: Primary | ICD-10-CM

## 2019-11-08 NOTE — TELEPHONE ENCOUNTER
"I spoke to the pt and she would like to wait to have her labs repeated on 11/18/ at her scheduled visit. She states "I am not too worried about it and I have a lot of testing going on right now." Advised pt that PCP wanted to have the test repeated before visit and pt does not wish to schedule at this time. Please advise.  "

## 2019-11-08 NOTE — TELEPHONE ENCOUNTER
----- Message from Steve Quintanilla MD sent at 11/8/2019  9:58 AM CST -----  Please call the patient to schedule recheck of BMP - noted slightly high sodium level, need to reassess.  Other labs are acceptable and we will discuss at her visit.

## 2019-11-12 ENCOUNTER — ANESTHESIA EVENT (OUTPATIENT)
Dept: ENDOSCOPY | Facility: HOSPITAL | Age: 54
End: 2019-11-12
Payer: COMMERCIAL

## 2019-11-12 ENCOUNTER — HOSPITAL ENCOUNTER (OUTPATIENT)
Facility: HOSPITAL | Age: 54
Discharge: HOME | End: 2019-11-12
Attending: INTERNAL MEDICINE | Admitting: INTERNAL MEDICINE
Payer: COMMERCIAL

## 2019-11-12 ENCOUNTER — ANESTHESIA (OUTPATIENT)
Dept: ENDOSCOPY | Facility: HOSPITAL | Age: 54
End: 2019-11-12
Payer: COMMERCIAL

## 2019-11-12 VITALS
RESPIRATION RATE: 18 BRPM | SYSTOLIC BLOOD PRESSURE: 105 MMHG | DIASTOLIC BLOOD PRESSURE: 60 MMHG | OXYGEN SATURATION: 98 % | HEART RATE: 68 BPM | TEMPERATURE: 98 F

## 2019-11-12 DIAGNOSIS — Z12.11 COLON CANCER SCREENING: ICD-10-CM

## 2019-11-12 PROCEDURE — 88305 TISSUE EXAM BY PATHOLOGIST: CPT | Performed by: PATHOLOGY

## 2019-11-12 PROCEDURE — D9220A PRA ANESTHESIA: Mod: 33,ANES,, | Performed by: ANESTHESIOLOGY

## 2019-11-12 PROCEDURE — D9220A PRA ANESTHESIA: Mod: 33,CRNA,, | Performed by: NURSE ANESTHETIST, CERTIFIED REGISTERED

## 2019-11-12 PROCEDURE — 45380 COLONOSCOPY AND BIOPSY: CPT | Performed by: INTERNAL MEDICINE

## 2019-11-12 PROCEDURE — 45385 COLONOSCOPY W/LESION REMOVAL: CPT | Mod: 33,,, | Performed by: INTERNAL MEDICINE

## 2019-11-12 PROCEDURE — D9220A PRA ANESTHESIA: ICD-10-PCS | Mod: 33,ANES,, | Performed by: ANESTHESIOLOGY

## 2019-11-12 PROCEDURE — 27201012 HC FORCEPS, HOT/COLD, DISP: Performed by: INTERNAL MEDICINE

## 2019-11-12 PROCEDURE — 37000009 HC ANESTHESIA EA ADD 15 MINS: Performed by: INTERNAL MEDICINE

## 2019-11-12 PROCEDURE — 63600175 PHARM REV CODE 636 W HCPCS: Performed by: NURSE ANESTHETIST, CERTIFIED REGISTERED

## 2019-11-12 PROCEDURE — 45385 PR COLONOSCOPY,REMV LESN,SNARE: ICD-10-PCS | Mod: 33,,, | Performed by: INTERNAL MEDICINE

## 2019-11-12 PROCEDURE — D9220A PRA ANESTHESIA: ICD-10-PCS | Mod: 33,CRNA,, | Performed by: NURSE ANESTHETIST, CERTIFIED REGISTERED

## 2019-11-12 PROCEDURE — 63600175 PHARM REV CODE 636 W HCPCS: Performed by: ANESTHESIOLOGY

## 2019-11-12 PROCEDURE — 37000008 HC ANESTHESIA 1ST 15 MINUTES: Performed by: INTERNAL MEDICINE

## 2019-11-12 PROCEDURE — 88305 TISSUE EXAM BY PATHOLOGIST: CPT | Mod: 26,,, | Performed by: PATHOLOGY

## 2019-11-12 PROCEDURE — 88305 TISSUE EXAM BY PATHOLOGIST: ICD-10-PCS | Mod: 26,,, | Performed by: PATHOLOGY

## 2019-11-12 RX ORDER — PROPOFOL 10 MG/ML
VIAL (ML) INTRAVENOUS
Status: COMPLETED
Start: 2019-11-12 | End: 2019-11-12

## 2019-11-12 RX ORDER — LIDOCAINE HCL/PF 100 MG/5ML
SYRINGE (ML) INTRAVENOUS
Status: DISCONTINUED | OUTPATIENT
Start: 2019-11-12 | End: 2019-11-12

## 2019-11-12 RX ORDER — SODIUM CHLORIDE 9 MG/ML
INJECTION, SOLUTION INTRAVENOUS CONTINUOUS
Status: DISCONTINUED | OUTPATIENT
Start: 2019-11-12 | End: 2019-11-12 | Stop reason: HOSPADM

## 2019-11-12 RX ORDER — LIDOCAINE HYDROCHLORIDE 20 MG/ML
INJECTION, SOLUTION EPIDURAL; INFILTRATION; INTRACAUDAL; PERINEURAL
Status: DISCONTINUED
Start: 2019-11-12 | End: 2019-11-12 | Stop reason: HOSPADM

## 2019-11-12 RX ORDER — PROPOFOL 10 MG/ML
VIAL (ML) INTRAVENOUS
Status: DISCONTINUED | OUTPATIENT
Start: 2019-11-12 | End: 2019-11-12

## 2019-11-12 RX ADMIN — PROPOFOL 20 MG: 10 INJECTION, EMULSION INTRAVENOUS at 07:11

## 2019-11-12 RX ADMIN — SODIUM CHLORIDE: 0.9 INJECTION, SOLUTION INTRAVENOUS at 07:11

## 2019-11-12 RX ADMIN — LIDOCAINE HYDROCHLORIDE 100 MG: 20 INJECTION, SOLUTION INTRAVENOUS at 07:11

## 2019-11-12 RX ADMIN — PROPOFOL 60 MG: 10 INJECTION, EMULSION INTRAVENOUS at 07:11

## 2019-11-12 NOTE — DISCHARGE INSTRUCTIONS

## 2019-11-12 NOTE — ANESTHESIA PREPROCEDURE EVALUATION
11/12/2019  Mirta Perry is a 54 y.o., female.    Anesthesia Evaluation     I have reviewed the Nursing Notes.      Review of Systems  Anesthesia Hx:  No problems with previous Anesthesia   Social:  Non-Smoker    Cardiovascular:  Cardiovascular Normal Exercise tolerance: good     Pulmonary:  Pulmonary Normal    Renal/:  Renal/ Normal     Hepatic/GI:   Bowel Prep. Denies PUD. Denies Hiatal Hernia.  Denies GERD. Denies Liver Disease.  Denies Hepatitis.    Neurological:   Denies CVA. Headaches Denies Seizures.    Endocrine:   Denies Diabetes. Hypothyroidism        Physical Exam  General:  Obesity    Airway/Jaw/Neck:  AIRWAY FINDINGS: Normal      Chest/Lungs:  Chest/Lungs Clear    Heart/Vascular:  Heart Findings: Normal       Mental Status:  Mental Status Findings: Normal        Anesthesia Plan  Type of Anesthesia, risks & benefits discussed:  Anesthesia Type:  general  Patient's Preference:   Intra-op Monitoring Plan: standard ASA monitors  Intra-op Monitoring Plan Comments:   Post Op Pain Control Plan:   Post Op Pain Control Plan Comments:   Induction:   IV  Beta Blocker:  Patient is not currently on a Beta-Blocker (No further documentation required).       Informed Consent: Patient understands risks and agrees with Anesthesia plan.  Questions answered. Anesthesia consent signed with patient.  ASA Score: 2     Day of Surgery Review of History & Physical:  There are no significant changes.  H&P update referred to the provider.         Ready For Surgery From Anesthesia Perspective.

## 2019-11-12 NOTE — [employer] PATIENT INSTRUCTIONS
Discharge Summary/Instructions after an Endoscopic Procedure  Patient Name: Mirta Perry  Patient MRN: [id number]  Patient YOB: 1965  Tuesday, November 12, 2019  Malick Rogers MD  RESTRICTIONS:  During your procedure today, you received medications for sedation.  These   medications may affect your judgment, balance and coordination.  Therefore,   for 24 hours, you have the following restrictions:   - DO NOT drive a car, operate machinery, make legal/financial decisions,   sign important papers or drink alcohol.    ACTIVITY:  Today: no heavy lifting, straining or running due to procedural   sedation/anesthesia.  The following day: return to full activity including work.  DIET:  Eat and drink normally unless instructed otherwise.     TREATMENT FOR COMMON SIDE EFFECTS:  - Mild abdominal pain, nausea, belching, bloating or excessive gas:  rest,   eat lightly and use a heating pad.  - Sore Throat: treat with throat lozenges and/or gargle with warm salt   water.  - Because air was used during the procedure, expelling large amounts of air   from your rectum or belching is normal.  - If a bowel prep was taken, you may not have a bowel movement for 1-3 days.    This is normal.  SYMPTOMS TO WATCH FOR AND REPORT TO YOUR PHYSICIAN:  1. Abdominal pain or bloating, other than gas cramps.  2. Chest pain.  3. Back pain.  4. Signs of infection such as: chills or fever occurring within 24 hours   after the procedure.  5. Rectal bleeding, which would show as bright red, maroon, or black stools.   (A tablespoon of blood from the rectum is not serious, especially if   hemorrhoids are present.)  6. Vomiting.  7. Weakness or dizziness.  GO DIRECTLY TO THE NEAREST EMERGENCY ROOM IF YOU HAVE ANY OF THE FOLLOWING:      Difficulty breathing              Chills and/or fever over 101 F   Persistent vomiting and/or vomiting blood   Severe abdominal pain   Severe chest pain   Black, tarry stools   Bleeding- more than one  tablespoon   Any other symptom or condition that you feel may need urgent attention  Your doctor recommends these additional instructions:  If any biopsies were taken, your doctors clinic will contact you in 1 to 2   weeks with any results.  - Discharge patient to home (with escort).   - Patient has a contact number available for emergencies.  The signs and   symptoms of potential delayed complications were discussed with the   patient.  Return to normal activities tomorrow.  Written discharge   instructions were provided to the patient.   - Resume previous diet.   - Continue present medications.   - Repeat colonoscopy in 5 years for surveillance.   - Telephone my office for pathology results in 1 week.   - No aspirin, ibuprofen, naproxen, or other non-steroidal anti-inflammatory   drugs for 5 days after polyp removal.  For questions, problems or results please call your physician - Malick Rogers MD at Work:  [phone].  [facility] can be reached at [phone]     IF A COMPLICATION OR EMERGENCY SITUATION ARISES AND YOU ARE UNABLE TO REACH   YOUR PHYSICIAN - GO DIRECTLY TO THE EMERGENCY ROOM.  MD Malick Velazquez II, MD  11/12/2019 7:37:40 AM  This report has been verified and signed electronically.  [employer]

## 2019-11-12 NOTE — H&P
Pre-Procedure H&P:  Reason for Procedure:  Colon screening    HPI:  Pt is a 54 y.o. female for screening colonoscopy    Past Medical History:   Diagnosis Date    Acute pyelonephritis 07/03/2018    Fatigue     Fibroids     uterine    Hernia, ventral     s/p cholectectomy    Hypothyroidism, acquired     Migraines     Vitamin D deficiency     Weight gain        Past Surgical History:   Procedure Laterality Date    APPENDECTOMY Bilateral     tummy tuck    AUGMENTATION OF BREAST Bilateral     BREAST SURGERY Bilateral     augmentation    CARPAL TUNNEL RELEASE Right 05/2014    CHOLECYSTECTOMY      laparoscopic    gastric sleeve      HYSTERECTOMY      meniscal surgery  May 2014    left knee    OOPHORECTOMY      1 ov removed    rotator cuff surgery Right     TUBAL LIGATION      bitateral       Family History   Problem Relation Age of Onset    Diabetes Mellitus Mother     Thyroid cancer Father         daughter     Parkinsonism Father     Heart disease Father     Breast cancer Unknown         both sides    Breast cancer Maternal Grandmother     Breast cancer Paternal Grandmother        Social History     Socioeconomic History    Marital status:      Spouse name: michael    Number of children: 4    Years of education: college    Highest education level: Not on file   Occupational History    Occupation:      Employer: [employer]   Social Needs    Financial resource strain: Not on file    Food insecurity:     Worry: Not on file     Inability: Not on file    Transportation needs:     Medical: Not on file     Non-medical: Not on file   Tobacco Use    Smoking status: Never Smoker    Smokeless tobacco: Never Used   Substance and Sexual Activity    Alcohol use: No    Drug use: No    Sexual activity: Yes     Partners: Male   Lifestyle    Physical activity:     Days per week: Not on file     Minutes per session: Not on file    Stress: Not on file   Relationships    Social  connections:     Talks on phone: Not on file     Gets together: Not on file     Attends Judaism service: Not on file     Active member of club or organization: Not on file     Attends meetings of clubs or organizations: Not on file     Relationship status: Not on file   Other Topics Concern    Not on file   Social History Narrative    Adult Screenings updated and reviewed  6/5/14    Mammogram( for females) overdue     Pap ( for females)overdue    Colonoscopy age  49- not done yet     Flu shot yearly not done for 2013     Td ?    Pneumovax recommended one time  at age  64    Zostavax recommended one time at  age  59    Eye exam 2 years Bone density - not done          U/s thyroid  4/24/14       Endoscopic History:  Denies    Review of patient's allergies indicates:   Allergen Reactions    Ciprofloxacin     Percocet  [oxycodone-acetaminophen]      Other reaction(s): Hives       No current facility-administered medications on file prior to encounter.      Current Outpatient Medications on File Prior to Encounter   Medication Sig Dispense Refill    amoxicillin (AMOXIL) 500 MG capsule take ONE CAPSULE BY MOUTH THREE TIMES DAILY FOR 7 DAYS  0    chlorhexidine (PERIDEX) 0.12 % solution SWISH & spit 1/2 OUNCE (15 ml) TWICE DAILY FOR 7 DAYS  0     mg tablet take ONE tablet BY MOUTH EVERY 8 HOURS WITH FOOD  0    NON FORMULARY MEDICATION apply 2 to 4 clicks topically every day  0    progesterone (PROGESTERONE IN OIL) 50 mg/mL injection apply 4 clicks topically every evening  0    SYNTHROID 125 mcg tablet Take one tablet by mouth daily on empty stomach apart from other meds 30 tablet 5    testosterone (ANDRODERM) 2 mg/24 hour PT24 place 1 tablet in the mouth 3 times a week, may increase to daily as tolerated  0    traMADol (ULTRAM) 50 mg tablet take ONE tablet BY MOUTH EVERY 6 HOURS as needed for pain. MAY CAUSE DROWSINESS  0       Current Facility-Administered Medications:     0.9%  NaCl infusion, ,  Intravenous, Continuous, Cipriano Cardona MD, Last Rate: 10 mL/hr at 11/12/19 0704    lidocaine (PF) 20 mg/mL (2%) 20 mg/mL (2 %) injection, , , ,     propofol (DIPRIVAN) 10 mg/mL IVP injection, , , ,     ROS: Negative x 10    No data found.    Gen: Well developed, well nourished, no apparent distress  HEENT: Anicteric, PERRLA  CV: S1, S2, no murmers/rubs, non-displaced PMI  Lungs: CTA-B, normal excursion  Abd: Soft, NT, ND, normal BS's, no HSM  Ext: No c/c/e, 1+ DP pulses to BLE's  Neuro: CN II-XII grossly intact, no asterixis.  Skin: No rashes/lesions.  Psych: AA&O x 4    Assessment:  Pt. Is a 54 y.o. female with:  Colon screening    Recommendations:  Colonoscopy      I would like to take this opportunity to thank you for this consult.  If you have any questions or concerns, please do not hesitate to contact me.

## 2019-11-12 NOTE — ANESTHESIA POSTPROCEDURE EVALUATION
Anesthesia Post Evaluation    Patient: Mirta Perry    Procedure(s) Performed: Procedure(s) (LRB):  COLONOSCOPY (N/A)    Final Anesthesia Type: general  Patient location during evaluation: GI PACU  Patient participation: Yes- Able to Participate  Level of consciousness: awake and alert  Post-procedure vital signs: reviewed and stable  Pain management: adequate  Airway patency: patent  PONV status at discharge: No PONV  Anesthetic complications: no      Cardiovascular status: blood pressure returned to baseline and hemodynamically stable  Respiratory status: unassisted and spontaneous ventilation  Hydration status: euvolemic  Follow-up not needed.          Vitals Value Taken Time   /60 11/12/2019  8:05 AM   Temp 36.4 °C (97.6 °F) 11/12/2019  7:35 AM   Pulse 68 11/12/2019  8:05 AM   Resp 18 11/12/2019  8:05 AM   SpO2 98 % 11/12/2019  8:05 AM         Event Time     Out of Recovery 08:13:05          Pain/Abiola Score: Abiola Score: 10 (11/12/2019  8:05 AM)  Modified Abiola Score: 20 (11/12/2019  8:05 AM)

## 2019-11-12 NOTE — TRANSFER OF CARE
Anesthesia Transfer of Care Note    Patient: Mirta Perry    Procedure(s) Performed: Procedure(s) (LRB):  COLONOSCOPY (N/A)    Patient location: GI    Anesthesia Type: general    Transport from OR: Transported from OR on room air with adequate spontaneous ventilation    Post pain: adequate analgesia    Post assessment: no apparent anesthetic complications and tolerated procedure well    Post vital signs: stable    Level of consciousness: responds to stimulation and awake    Nausea/Vomiting: no nausea/vomiting    Complications: none    Transfer of care protocol was followed      Last vitals:   Visit Vitals  BP (!) 98/57 (BP Location: Left arm, Patient Position: Lying)   Pulse 69   Temp 36.4 °C (97.6 °F) (Oral)   Resp 14   SpO2 96%   Breastfeeding? No

## 2019-11-14 LAB
FINAL PATHOLOGIC DIAGNOSIS: NORMAL
GROSS: NORMAL

## 2019-11-18 ENCOUNTER — OFFICE VISIT (OUTPATIENT)
Dept: FAMILY MEDICINE | Facility: CLINIC | Age: 54
End: 2019-11-18
Payer: COMMERCIAL

## 2019-11-18 VITALS
OXYGEN SATURATION: 98 % | HEIGHT: 65 IN | WEIGHT: 189.13 LBS | RESPIRATION RATE: 17 BRPM | TEMPERATURE: 98 F | SYSTOLIC BLOOD PRESSURE: 114 MMHG | DIASTOLIC BLOOD PRESSURE: 70 MMHG | BODY MASS INDEX: 31.51 KG/M2 | HEART RATE: 78 BPM

## 2019-11-18 DIAGNOSIS — Z00.00 HEALTHCARE MAINTENANCE: Primary | ICD-10-CM

## 2019-11-18 DIAGNOSIS — E03.9 HYPOTHYROIDISM, UNSPECIFIED TYPE: ICD-10-CM

## 2019-11-18 PROCEDURE — 90472 ZOSTER RECOMBINANT VACCINE: ICD-10-PCS | Mod: S$GLB,,, | Performed by: INTERNAL MEDICINE

## 2019-11-18 PROCEDURE — 99214 OFFICE O/P EST MOD 30 MIN: CPT | Mod: 25,S$GLB,, | Performed by: INTERNAL MEDICINE

## 2019-11-18 PROCEDURE — 90686 IIV4 VACC NO PRSV 0.5 ML IM: CPT | Mod: S$GLB,,, | Performed by: INTERNAL MEDICINE

## 2019-11-18 PROCEDURE — 90471 IMMUNIZATION ADMIN: CPT | Mod: S$GLB,,, | Performed by: INTERNAL MEDICINE

## 2019-11-18 PROCEDURE — 99999 PR PBB SHADOW E&M-EST. PATIENT-LVL III: CPT | Mod: PBBFAC,,, | Performed by: INTERNAL MEDICINE

## 2019-11-18 PROCEDURE — 90472 IMMUNIZATION ADMIN EACH ADD: CPT | Mod: S$GLB,,, | Performed by: INTERNAL MEDICINE

## 2019-11-18 PROCEDURE — 99999 PR PBB SHADOW E&M-EST. PATIENT-LVL III: ICD-10-PCS | Mod: PBBFAC,,, | Performed by: INTERNAL MEDICINE

## 2019-11-18 PROCEDURE — 90471 FLU VACCINE (QUAD) GREATER THAN OR EQUAL TO 3YO PRESERVATIVE FREE IM: ICD-10-PCS | Mod: S$GLB,,, | Performed by: INTERNAL MEDICINE

## 2019-11-18 PROCEDURE — 99214 PR OFFICE/OUTPT VISIT, EST, LEVL IV, 30-39 MIN: ICD-10-PCS | Mod: 25,S$GLB,, | Performed by: INTERNAL MEDICINE

## 2019-11-18 PROCEDURE — 90750 HZV VACC RECOMBINANT IM: CPT | Mod: S$GLB,,, | Performed by: INTERNAL MEDICINE

## 2019-11-18 PROCEDURE — 90750 ZOSTER RECOMBINANT VACCINE: ICD-10-PCS | Mod: S$GLB,,, | Performed by: INTERNAL MEDICINE

## 2019-11-18 PROCEDURE — 90686 FLU VACCINE (QUAD) GREATER THAN OR EQUAL TO 3YO PRESERVATIVE FREE IM: ICD-10-PCS | Mod: S$GLB,,, | Performed by: INTERNAL MEDICINE

## 2019-11-18 RX ORDER — LEVOTHYROXINE SODIUM 125 UG/1
TABLET ORAL
Qty: 90 TABLET | Refills: 3 | Status: SHIPPED | OUTPATIENT
Start: 2019-11-18 | End: 2020-11-18 | Stop reason: SDUPTHER

## 2019-11-18 NOTE — PROGRESS NOTES
HISTORY OF PRESENT ILLNESS:  Mirta Perry is a 54 y.o. female who presents to the clinic today for Follow-up (4 month)    LOV 7/9/19    Upcoming colonoscopy 11/12/19.  Overall doing well since last visit.  Spends weekdays caring for granddaughter. No complaints today.    PAST MEDICAL HISTORY:  Past Medical History:   Diagnosis Date    Acute pyelonephritis 07/03/2018    Fatigue     Fibroids     uterine    Hernia, ventral     s/p cholectectomy    Hypothyroidism, acquired     Migraines     Vitamin D deficiency     Weight gain        PAST SURGICAL HISTORY:  Past Surgical History:   Procedure Laterality Date    APPENDECTOMY Bilateral     tummy tuck    AUGMENTATION OF BREAST Bilateral     BREAST SURGERY Bilateral     augmentation    CARPAL TUNNEL RELEASE Right 05/2014    CHOLECYSTECTOMY      laparoscopic    COLONOSCOPY N/A 11/12/2019    Procedure: COLONOSCOPY;  Surgeon: Malick Rogers II, MD;  [facility];  Service: Endoscopy;  Laterality: N/A;  confirmed appt-sp    gastric sleeve      HYSTERECTOMY      meniscal surgery  May 2014    left knee    OOPHORECTOMY      1 ov removed    rotator cuff surgery Right     TUBAL LIGATION      bitateral       SOCIAL HISTORY:  Social History     Socioeconomic History    Marital status:      Spouse name: michael    Number of children: 4    Years of education: college    Highest education level: Not on file   Occupational History    Occupation:      Employer: [employer]   Social Needs    Financial resource strain: Not on file    Food insecurity:     Worry: Not on file     Inability: Not on file    Transportation needs:     Medical: Not on file     Non-medical: Not on file   Tobacco Use    Smoking status: Never Smoker    Smokeless tobacco: Never Used   Substance and Sexual Activity    Alcohol use: No    Drug use: No    Sexual activity: Yes     Partners: Male   Lifestyle    Physical activity:     Days per week: Not on file      Minutes per session: Not on file    Stress: Not on file   Relationships    Social connections:     Talks on phone: Not on file     Gets together: Not on file     Attends Hoahaoism service: Not on file     Active member of club or organization: Not on file     Attends meetings of clubs or organizations: Not on file     Relationship status: Not on file   Other Topics Concern    Not on file   Social History Narrative    Adult Screenings updated and reviewed  6/5/14    Mammogram( for females) overdue     Pap ( for females)overdue    Colonoscopy age  49- not done yet     Flu shot yearly not done for 2013     Td ?    Pneumovax recommended one time  at age  64    Zostavax recommended one time at  age  59    Eye exam 2 years Bone density - not done          U/s thyroid  4/24/14       FAMILY HISTORY:  Family History   Problem Relation Age of Onset    Diabetes Mellitus Mother     Thyroid cancer Father         daughter     Parkinsonism Father     Heart disease Father     Breast cancer Unknown         both sides    Breast cancer Maternal Grandmother     Breast cancer Paternal Grandmother        ALLERGIES AND MEDICATIONS: updated and reviewed.  Review of patient's allergies indicates:   Allergen Reactions    Ciprofloxacin     Percocet  [oxycodone-acetaminophen]      Other reaction(s): Hives     Medication List with Changes/Refills   Current Medications    AMOXICILLIN (AMOXIL) 500 MG CAPSULE    take ONE CAPSULE BY MOUTH THREE TIMES DAILY FOR 7 DAYS    CHLORHEXIDINE (PERIDEX) 0.12 % SOLUTION    SWISH & spit 1/2 OUNCE (15 ml) TWICE DAILY FOR 7 DAYS     MG TABLET    take ONE tablet BY MOUTH EVERY 8 HOURS WITH FOOD    NON FORMULARY MEDICATION    apply 2 to 4 clicks topically every day    PROGESTERONE (PROGESTERONE IN OIL) 50 MG/ML INJECTION    apply 4 clicks topically every evening    SYNTHROID 125 MCG TABLET    Take one tablet by mouth daily on empty stomach apart from other meds    TESTOSTERONE (ANDRODERM) 2  MG/24 HOUR PT24    place 1 tablet in the mouth 3 times a week, may increase to daily as tolerated    TRAMADOL (ULTRAM) 50 MG TABLET    take ONE tablet BY MOUTH EVERY 6 HOURS as needed for pain. MAY CAUSE DROWSINESS          CARE TEAM:  Patient Care Team:  Steve Quintanilla MD as PCP - General (Internal Medicine)  Deisy Wetzel MA as Care Coordinator         REVIEW OF SYSTEMS:  Review of Systems   Constitutional: Negative for fatigue and fever.   HENT: Negative for congestion and postnasal drip.    Eyes: Negative for photophobia and visual disturbance.   Respiratory: Negative for cough and shortness of breath.    Cardiovascular: Negative for chest pain and palpitations.   Gastrointestinal: Negative for abdominal pain, nausea and vomiting.   Genitourinary: Negative for dysuria and frequency.   Musculoskeletal: Negative for arthralgias and back pain.   Neurological: Negative for light-headedness and headaches.   Psychiatric/Behavioral: Negative for dysphoric mood. The patient is not nervous/anxious.          PHYSICAL EXAM:   Vitals:    11/18/19 0835   BP: 114/70   Pulse: 78   Resp: 17   Temp: 98 °F (36.7 °C)             Body mass index is 31.48 kg/m².     General appearance - alert, well appearing, and in no distress and overweight  Mental status - normal mood, behavior, speech, dress, motor activity, and thought processes  Eyes - sclera anicteric, left eye normal, right eye normal  Chest - clear to auscultation, no wheezes, rales or rhonchi, symmetric air entry  Heart - normal rate and regular rhythm  Neurological - alert, oriented, normal speech, no focal findings or movement disorder noted, motor and sensory grossly normal bilaterally  Extremities - no pedal edema noted      ASSESSMENT AND PLAN:  1. Hypothyroidism, unspecified type  - SYNTHROID 125 mcg tablet; Take one tablet by mouth daily on empty stomach apart from other meds  Dispense: 90 tablet; Refill: 3    2. Healthcare maintenance  - Influenza -  Quadrivalent (6 months+) (PF)  - (In Office Administered) Zoster Recombinant Vaccine  - (In Office Administered) Zoster Recombinant Vaccine; Future         Follow up 6 months or sooner as needed.

## 2020-02-25 NOTE — PROGRESS NOTES
"[facility] Medicine  Progress Note    Patient Name: Mrita Perry  MRN: [id number]  Patient Class: IP- Inpatient   Admission Date: 7/3/2018  Length of Stay: 2 days  Attending Physician: Ludivina Benitez MD  Primary Care Provider: Chreie Del Toro MD        Subjective:     Principal Problem:Pyelonephritis, acute    HPI:  54 y/o female with uterine fibroids, hx of kidney stones, hypothyroidism,and hysterectomy presents to the ED c/o x5 day hx of L sided flank pain that now radiates to LLQ abdomen. The pain is severe (9/10) and worse with palpation and urination. She reports a "pulsating" pain with urination. The patient states "it feels like a kidney stone that I got 30 yrs ago." The patient also reports subjective fever, chills, nausea, diarrhea, and dysuria. The patient attempted AZO with slight relief. The patient reports hematuria x5 days ago that resolved after taking AZO. The patient denies emesis, vaginal d/c, or vaginal bleeding.patioent is septic with fever 103,tachycadia 136,source acute pyelonephritis,CT show 3 mm urethral obstruction with hydronephrosis and hydroureter,she has left CVA tenderness as well,cultures has been done and she has been started on IV Abx and IVF.    Hospital Course:  Ms. Perry was admitted with sepsis due to acute left pyelonephritis. Workup with CT with stone protocol showed 3 mm urethral obstruction with hydronephrosis and hydroureter and she had left CVA tenderness. Pt was treated empirically with IVF, Rocephin and urine and blood cultures were obtained. Urology was consulted and she underwent further evaluation with retroperitoneal U/S which showed resolution of left sided hydronephrosis and left CVA pain/tenderness resolved. Urine and blood culture was remarkable for E.coli which was pan sensitive. Repeat blood culture were obtained on 7/4/2018 which were NGTD. Treatment options (antibiotics) were discussed with the patient, and " Are there any additional tests/labs patient should complete when seen next week by MA only?  Can you extend the expiration date on the order for the EKG or enter a new order?  Harriett Garcia RN   initially the plan was to convert to Cipro but patient reported severe reaction with N/V/diarrhea with prolonged symptoms, therefore, we discussed treatment with IV Rocephin with PICC and home health on discharge if her repeat blood cultures remain negative.    Interval History: Pt report feeling much better, no more pain. Reports low BP at baseline.    Review of Systems   Constitutional: Negative for chills and fever.   Respiratory: Negative for shortness of breath.    Cardiovascular: Negative for leg swelling.   Gastrointestinal: Negative for abdominal pain.     Objective:     Vital Signs (Most Recent):  Temp: 98.6 °F (37 °C) (07/05/18 1938)  Pulse: 70 (07/05/18 1938)  Resp: 18 (07/05/18 1938)  BP: (!) 102/55 (07/05/18 1938)  SpO2: (!) 92 % (07/05/18 2100) Vital Signs (24h Range):  Temp:  [98.6 °F (37 °C)-100 °F (37.8 °C)] 98.6 °F (37 °C)  Pulse:  [60-71] 70  Resp:  [17-18] 18  SpO2:  [92 %-98 %] 92 %  BP: ()/(51-66) 102/55     Weight: 77.1 kg (170 lb)  Body mass index is 28.29 kg/m².    Intake/Output Summary (Last 24 hours) at 07/05/18 2135  Last data filed at 07/05/18 0700   Gross per 24 hour   Intake             1550 ml   Output                0 ml   Net             1550 ml      Physical Exam   Constitutional: She is oriented to person, place, and time. She appears well-developed and well-nourished. No distress.   HENT:   Head: Normocephalic and atraumatic.   Eyes: EOM are normal. Pupils are equal, round, and reactive to light.   Neck: Normal range of motion. Neck supple.   Cardiovascular: Normal rate and regular rhythm.    Pulmonary/Chest: Effort normal and breath sounds normal.   Abdominal: Soft. Bowel sounds are normal. She exhibits no distension. There is no tenderness.   Musculoskeletal: Normal range of motion.   Neurological: She is alert and oriented to person, place, and time.   Skin: Skin is warm and dry.   Psychiatric: She has a normal mood and affect. Her behavior is normal. Thought content  normal.       Significant Labs: All pertinent labs within the past 24 hours have been reviewed.    Significant Imaging: I have reviewed and interpreted all pertinent imaging results/findings within the past 24 hours.    Assessment/Plan:      * Pyelonephritis, acute    - Continue Rocephin  - Pt cannot tolerate Cipro which is the only PO option given bacteremia  - Will need to treat with Rocephin for 2 weeks from date of negative culture  - Plan for discharge tomorrow with PICC and home health        Sepsis    - Met criteria for sepsis with bacteremia and pyelonephritis  - Resolved with treatment  - Follow up repeat blood culture        Hydronephrosis with urinary obstruction due to ureteral calculus    - Appreciate Urology input  - Resolved on U/S        Hydroureter on left    - As discussed above, resolved        Bacteremia due to Gram-negative bacteria    - Source due to acute pyelonephritis  - Continue Rocephin  - Repeat blood culture NGTD  - As discussed above        Hypothyroidism, acquired    - Continue synthroid            VTE Risk Mitigation         Ordered     IP VTE LOW RISK PATIENT  Once      07/03/18 1604     Place KELLY hose  Until discontinued      07/03/18 1604     Place sequential compression device  Until discontinued      07/03/18 1604              Ludivina Benitez MD  Department of Hospital Medicine   [facility]

## 2020-11-13 DIAGNOSIS — Z12.31 OTHER SCREENING MAMMOGRAM: ICD-10-CM

## 2020-11-18 DIAGNOSIS — E03.9 HYPOTHYROIDISM, UNSPECIFIED TYPE: ICD-10-CM

## 2020-11-20 DIAGNOSIS — R53.83 FATIGUE, UNSPECIFIED TYPE: ICD-10-CM

## 2020-11-20 DIAGNOSIS — Z00.00 HEALTHCARE MAINTENANCE: ICD-10-CM

## 2020-11-20 DIAGNOSIS — E55.9 VITAMIN D DEFICIENCY DISEASE: ICD-10-CM

## 2020-11-20 DIAGNOSIS — E03.9 HYPOTHYROIDISM, ACQUIRED: Primary | ICD-10-CM

## 2020-11-20 RX ORDER — LEVOTHYROXINE SODIUM 125 UG/1
TABLET ORAL
Qty: 90 TABLET | Refills: 0 | Status: SHIPPED | OUTPATIENT
Start: 2020-11-20 | End: 2021-03-26 | Stop reason: SDUPTHER

## 2020-12-03 NOTE — TELEPHONE ENCOUNTER
Called pt to schedule 1 month labs and follow up per Dr. Quintanilla. Pt states will call the appt center to schedule.

## 2021-01-04 ENCOUNTER — PATIENT MESSAGE (OUTPATIENT)
Dept: ADMINISTRATIVE | Facility: HOSPITAL | Age: 56
End: 2021-01-04

## 2021-03-26 DIAGNOSIS — E03.9 HYPOTHYROIDISM, UNSPECIFIED TYPE: ICD-10-CM

## 2021-03-29 RX ORDER — LEVOTHYROXINE SODIUM 125 UG/1
TABLET ORAL
Qty: 90 TABLET | Refills: 0 | Status: SHIPPED | OUTPATIENT
Start: 2021-03-29 | End: 2021-07-12 | Stop reason: SDUPTHER

## 2021-03-30 ENCOUNTER — TELEPHONE (OUTPATIENT)
Dept: FAMILY MEDICINE | Facility: CLINIC | Age: 56
End: 2021-03-30

## 2021-04-01 ENCOUNTER — LAB VISIT (OUTPATIENT)
Dept: LAB | Facility: HOSPITAL | Age: 56
End: 2021-04-01
Attending: INTERNAL MEDICINE
Payer: COMMERCIAL

## 2021-04-01 DIAGNOSIS — Z00.00 HEALTHCARE MAINTENANCE: ICD-10-CM

## 2021-04-01 DIAGNOSIS — R53.83 FATIGUE, UNSPECIFIED TYPE: ICD-10-CM

## 2021-04-01 DIAGNOSIS — E55.9 VITAMIN D DEFICIENCY DISEASE: ICD-10-CM

## 2021-04-01 DIAGNOSIS — E03.9 HYPOTHYROIDISM, ACQUIRED: ICD-10-CM

## 2021-04-01 LAB
ALBUMIN SERPL BCP-MCNC: 4.1 G/DL (ref 3.5–5.2)
ALP SERPL-CCNC: 89 U/L (ref 55–135)
ALT SERPL W/O P-5'-P-CCNC: 21 U/L (ref 10–44)
ANION GAP SERPL CALC-SCNC: 10 MMOL/L (ref 8–16)
AST SERPL-CCNC: 25 U/L (ref 10–40)
BASOPHILS # BLD AUTO: 0.09 K/UL (ref 0–0.2)
BASOPHILS NFR BLD: 1.5 % (ref 0–1.9)
BILIRUB SERPL-MCNC: 0.5 MG/DL (ref 0.1–1)
BUN SERPL-MCNC: 15 MG/DL (ref 6–20)
CALCIUM SERPL-MCNC: 9.6 MG/DL (ref 8.7–10.5)
CHLORIDE SERPL-SCNC: 106 MMOL/L (ref 95–110)
CHOLEST SERPL-MCNC: 215 MG/DL (ref 120–199)
CHOLEST/HDLC SERPL: 4.7 {RATIO} (ref 2–5)
CO2 SERPL-SCNC: 27 MMOL/L (ref 23–29)
CREAT SERPL-MCNC: 1 MG/DL (ref 0.5–1.4)
DIFFERENTIAL METHOD: NORMAL
EOSINOPHIL # BLD AUTO: 0.4 K/UL (ref 0–0.5)
EOSINOPHIL NFR BLD: 7 % (ref 0–8)
ERYTHROCYTE [DISTWIDTH] IN BLOOD BY AUTOMATED COUNT: 12.5 % (ref 11.5–14.5)
EST. GFR  (AFRICAN AMERICAN): >60 ML/MIN/1.73 M^2
EST. GFR  (NON AFRICAN AMERICAN): >60 ML/MIN/1.73 M^2
GLUCOSE SERPL-MCNC: 92 MG/DL (ref 70–110)
HCT VFR BLD AUTO: 42.6 % (ref 37–48.5)
HDLC SERPL-MCNC: 46 MG/DL (ref 40–75)
HDLC SERPL: 21.4 % (ref 20–50)
HGB BLD-MCNC: 13.7 G/DL (ref 12–16)
IMM GRANULOCYTES # BLD AUTO: 0.01 K/UL (ref 0–0.04)
IMM GRANULOCYTES NFR BLD AUTO: 0.2 % (ref 0–0.5)
LDLC SERPL CALC-MCNC: 143.2 MG/DL (ref 63–159)
LYMPHOCYTES # BLD AUTO: 1.8 K/UL (ref 1–4.8)
LYMPHOCYTES NFR BLD: 30.1 % (ref 18–48)
MCH RBC QN AUTO: 28.5 PG (ref 27–31)
MCHC RBC AUTO-ENTMCNC: 32.2 G/DL (ref 32–36)
MCV RBC AUTO: 89 FL (ref 82–98)
MONOCYTES # BLD AUTO: 0.4 K/UL (ref 0.3–1)
MONOCYTES NFR BLD: 7 % (ref 4–15)
NEUTROPHILS # BLD AUTO: 3.3 K/UL (ref 1.8–7.7)
NEUTROPHILS NFR BLD: 54.2 % (ref 38–73)
NONHDLC SERPL-MCNC: 169 MG/DL
NRBC BLD-RTO: 0 /100 WBC
PLATELET # BLD AUTO: 331 K/UL (ref 150–450)
PMV BLD AUTO: 10.3 FL (ref 9.2–12.9)
POTASSIUM SERPL-SCNC: 4.7 MMOL/L (ref 3.5–5.1)
PROT SERPL-MCNC: 7.2 G/DL (ref 6–8.4)
RBC # BLD AUTO: 4.81 M/UL (ref 4–5.4)
SODIUM SERPL-SCNC: 143 MMOL/L (ref 136–145)
T4 FREE SERPL-MCNC: 0.96 NG/DL (ref 0.71–1.51)
TRIGL SERPL-MCNC: 129 MG/DL (ref 30–150)
TSH SERPL DL<=0.005 MIU/L-ACNC: 4.75 UIU/ML (ref 0.4–4)
WBC # BLD AUTO: 6.02 K/UL (ref 3.9–12.7)

## 2021-04-01 PROCEDURE — 82306 VITAMIN D 25 HYDROXY: CPT | Performed by: INTERNAL MEDICINE

## 2021-04-01 PROCEDURE — 84439 ASSAY OF FREE THYROXINE: CPT | Performed by: INTERNAL MEDICINE

## 2021-04-01 PROCEDURE — 36415 COLL VENOUS BLD VENIPUNCTURE: CPT | Mod: PN | Performed by: INTERNAL MEDICINE

## 2021-04-01 PROCEDURE — 85025 COMPLETE CBC W/AUTO DIFF WBC: CPT | Performed by: INTERNAL MEDICINE

## 2021-04-01 PROCEDURE — 84443 ASSAY THYROID STIM HORMONE: CPT | Performed by: INTERNAL MEDICINE

## 2021-04-01 PROCEDURE — 80053 COMPREHEN METABOLIC PANEL: CPT | Performed by: INTERNAL MEDICINE

## 2021-04-01 PROCEDURE — 83036 HEMOGLOBIN GLYCOSYLATED A1C: CPT | Performed by: INTERNAL MEDICINE

## 2021-04-01 PROCEDURE — 80061 LIPID PANEL: CPT | Performed by: INTERNAL MEDICINE

## 2021-04-02 LAB
25(OH)D3+25(OH)D2 SERPL-MCNC: 30 NG/ML (ref 30–96)
ESTIMATED AVG GLUCOSE: 111 MG/DL (ref 68–131)
HBA1C MFR BLD: 5.5 % (ref 4–5.6)

## 2021-04-06 ENCOUNTER — PATIENT MESSAGE (OUTPATIENT)
Dept: ADMINISTRATIVE | Facility: HOSPITAL | Age: 56
End: 2021-04-06

## 2021-04-16 ENCOUNTER — PATIENT MESSAGE (OUTPATIENT)
Dept: RESEARCH | Facility: HOSPITAL | Age: 56
End: 2021-04-16

## 2021-07-12 DIAGNOSIS — E03.9 HYPOTHYROIDISM, UNSPECIFIED TYPE: ICD-10-CM

## 2021-07-12 RX ORDER — LEVOTHYROXINE SODIUM 125 UG/1
TABLET ORAL
Qty: 90 TABLET | Refills: 0 | Status: SHIPPED | OUTPATIENT
Start: 2021-07-12 | End: 2021-08-02 | Stop reason: SDUPTHER

## 2021-08-03 ENCOUNTER — PATIENT MESSAGE (OUTPATIENT)
Dept: FAMILY MEDICINE | Facility: CLINIC | Age: 56
End: 2021-08-03

## 2021-10-04 ENCOUNTER — PATIENT MESSAGE (OUTPATIENT)
Dept: ADMINISTRATIVE | Facility: HOSPITAL | Age: 56
End: 2021-10-04

## 2021-11-02 ENCOUNTER — OFFICE VISIT (OUTPATIENT)
Dept: FAMILY MEDICINE | Facility: CLINIC | Age: 56
End: 2021-11-02
Payer: COMMERCIAL

## 2021-11-02 VITALS
HEART RATE: 83 BPM | RESPIRATION RATE: 18 BRPM | WEIGHT: 205.25 LBS | BODY MASS INDEX: 34.16 KG/M2 | SYSTOLIC BLOOD PRESSURE: 128 MMHG | DIASTOLIC BLOOD PRESSURE: 74 MMHG | OXYGEN SATURATION: 98 %

## 2021-11-02 DIAGNOSIS — N95.1 HOT FLASH, MENOPAUSAL: ICD-10-CM

## 2021-11-02 DIAGNOSIS — E03.9 HYPOTHYROIDISM, UNSPECIFIED TYPE: ICD-10-CM

## 2021-11-02 DIAGNOSIS — Z01.84 ENCOUNTER FOR ANTIBODY RESPONSE EXAMINATION: ICD-10-CM

## 2021-11-02 DIAGNOSIS — E55.9 VITAMIN D DEFICIENCY DISEASE: ICD-10-CM

## 2021-11-02 DIAGNOSIS — Z00.00 HEALTHCARE MAINTENANCE: ICD-10-CM

## 2021-11-02 DIAGNOSIS — Z00.00 ANNUAL PHYSICAL EXAM: Primary | ICD-10-CM

## 2021-11-02 DIAGNOSIS — E03.9 HYPOTHYROIDISM, ACQUIRED: ICD-10-CM

## 2021-11-02 PROCEDURE — 99999 PR PBB SHADOW E&M-EST. PATIENT-LVL III: ICD-10-PCS | Mod: PBBFAC,,, | Performed by: INTERNAL MEDICINE

## 2021-11-02 PROCEDURE — 99214 OFFICE O/P EST MOD 30 MIN: CPT | Mod: S$GLB,,, | Performed by: INTERNAL MEDICINE

## 2021-11-02 PROCEDURE — 99214 PR OFFICE/OUTPT VISIT, EST, LEVL IV, 30-39 MIN: ICD-10-PCS | Mod: S$GLB,,, | Performed by: INTERNAL MEDICINE

## 2021-11-02 PROCEDURE — 99999 PR PBB SHADOW E&M-EST. PATIENT-LVL III: CPT | Mod: PBBFAC,,, | Performed by: INTERNAL MEDICINE

## 2021-11-02 RX ORDER — LEVOTHYROXINE SODIUM 125 UG/1
TABLET ORAL
Qty: 90 TABLET | Refills: 1 | Status: SHIPPED | OUTPATIENT
Start: 2021-11-02 | End: 2021-11-11

## 2021-11-02 RX ORDER — GABAPENTIN 100 MG/1
100 CAPSULE ORAL NIGHTLY
Qty: 30 CAPSULE | Refills: 2 | Status: SHIPPED | OUTPATIENT
Start: 2021-11-02 | End: 2022-02-17 | Stop reason: SDUPTHER

## 2021-11-03 ENCOUNTER — PATIENT MESSAGE (OUTPATIENT)
Dept: ADMINISTRATIVE | Facility: HOSPITAL | Age: 56
End: 2021-11-03
Payer: COMMERCIAL

## 2021-11-04 ENCOUNTER — LAB VISIT (OUTPATIENT)
Dept: LAB | Facility: HOSPITAL | Age: 56
End: 2021-11-04
Attending: INTERNAL MEDICINE
Payer: COMMERCIAL

## 2021-11-04 DIAGNOSIS — E03.9 HYPOTHYROIDISM, ACQUIRED: ICD-10-CM

## 2021-11-04 DIAGNOSIS — Z01.84 ENCOUNTER FOR ANTIBODY RESPONSE EXAMINATION: ICD-10-CM

## 2021-11-04 DIAGNOSIS — Z00.00 HEALTHCARE MAINTENANCE: ICD-10-CM

## 2021-11-04 LAB
ALBUMIN SERPL BCP-MCNC: 4 G/DL (ref 3.5–5.2)
ALP SERPL-CCNC: 75 U/L (ref 55–135)
ALT SERPL W/O P-5'-P-CCNC: 23 U/L (ref 10–44)
ANION GAP SERPL CALC-SCNC: 10 MMOL/L (ref 8–16)
AST SERPL-CCNC: 25 U/L (ref 10–40)
BASOPHILS # BLD AUTO: 0.07 K/UL (ref 0–0.2)
BASOPHILS NFR BLD: 1.1 % (ref 0–1.9)
BILIRUB SERPL-MCNC: 0.6 MG/DL (ref 0.1–1)
BUN SERPL-MCNC: 17 MG/DL (ref 6–20)
CALCIUM SERPL-MCNC: 10.3 MG/DL (ref 8.7–10.5)
CHLORIDE SERPL-SCNC: 109 MMOL/L (ref 95–110)
CHOLEST SERPL-MCNC: 202 MG/DL (ref 120–199)
CHOLEST/HDLC SERPL: 4.8 {RATIO} (ref 2–5)
CO2 SERPL-SCNC: 25 MMOL/L (ref 23–29)
CREAT SERPL-MCNC: 1.2 MG/DL (ref 0.5–1.4)
DIFFERENTIAL METHOD: NORMAL
EOSINOPHIL # BLD AUTO: 0.2 K/UL (ref 0–0.5)
EOSINOPHIL NFR BLD: 3.2 % (ref 0–8)
ERYTHROCYTE [DISTWIDTH] IN BLOOD BY AUTOMATED COUNT: 12.5 % (ref 11.5–14.5)
EST. GFR  (AFRICAN AMERICAN): 58 ML/MIN/1.73 M^2
EST. GFR  (NON AFRICAN AMERICAN): 50 ML/MIN/1.73 M^2
ESTIMATED AVG GLUCOSE: 111 MG/DL (ref 68–131)
GLUCOSE SERPL-MCNC: 94 MG/DL (ref 70–110)
HBA1C MFR BLD: 5.5 % (ref 4–5.6)
HCT VFR BLD AUTO: 41.7 % (ref 37–48.5)
HDLC SERPL-MCNC: 42 MG/DL (ref 40–75)
HDLC SERPL: 20.8 % (ref 20–50)
HGB BLD-MCNC: 13.6 G/DL (ref 12–16)
IMM GRANULOCYTES # BLD AUTO: 0.01 K/UL (ref 0–0.04)
IMM GRANULOCYTES NFR BLD AUTO: 0.2 % (ref 0–0.5)
LDLC SERPL CALC-MCNC: 137.8 MG/DL (ref 63–159)
LYMPHOCYTES # BLD AUTO: 1.8 K/UL (ref 1–4.8)
LYMPHOCYTES NFR BLD: 28.1 % (ref 18–48)
MCH RBC QN AUTO: 29.7 PG (ref 27–31)
MCHC RBC AUTO-ENTMCNC: 32.6 G/DL (ref 32–36)
MCV RBC AUTO: 91 FL (ref 82–98)
MONOCYTES # BLD AUTO: 0.5 K/UL (ref 0.3–1)
MONOCYTES NFR BLD: 7.6 % (ref 4–15)
NEUTROPHILS # BLD AUTO: 3.7 K/UL (ref 1.8–7.7)
NEUTROPHILS NFR BLD: 59.8 % (ref 38–73)
NONHDLC SERPL-MCNC: 160 MG/DL
NRBC BLD-RTO: 0 /100 WBC
PLATELET # BLD AUTO: 354 K/UL (ref 150–450)
PMV BLD AUTO: 10 FL (ref 9.2–12.9)
POTASSIUM SERPL-SCNC: 5.5 MMOL/L (ref 3.5–5.1)
PROT SERPL-MCNC: 7.5 G/DL (ref 6–8.4)
RBC # BLD AUTO: 4.58 M/UL (ref 4–5.4)
SARS-COV-2 IGG SERPL IA-ACNC: ABNORMAL AU/ML
SARS-COV-2 IGG SERPL QL IA: POSITIVE
SODIUM SERPL-SCNC: 144 MMOL/L (ref 136–145)
T4 FREE SERPL-MCNC: 1.02 NG/DL (ref 0.71–1.51)
TRIGL SERPL-MCNC: 111 MG/DL (ref 30–150)
TSH SERPL DL<=0.005 MIU/L-ACNC: 4.29 UIU/ML (ref 0.4–4)
WBC # BLD AUTO: 6.22 K/UL (ref 3.9–12.7)

## 2021-11-04 PROCEDURE — 80053 COMPREHEN METABOLIC PANEL: CPT | Performed by: INTERNAL MEDICINE

## 2021-11-04 PROCEDURE — 86769 SARS-COV-2 COVID-19 ANTIBODY: CPT | Performed by: INTERNAL MEDICINE

## 2021-11-04 PROCEDURE — 84443 ASSAY THYROID STIM HORMONE: CPT | Performed by: INTERNAL MEDICINE

## 2021-11-04 PROCEDURE — 84439 ASSAY OF FREE THYROXINE: CPT | Performed by: INTERNAL MEDICINE

## 2021-11-04 PROCEDURE — 85025 COMPLETE CBC W/AUTO DIFF WBC: CPT | Performed by: INTERNAL MEDICINE

## 2021-11-04 PROCEDURE — 36415 COLL VENOUS BLD VENIPUNCTURE: CPT | Mod: PN | Performed by: INTERNAL MEDICINE

## 2021-11-04 PROCEDURE — 80061 LIPID PANEL: CPT | Performed by: INTERNAL MEDICINE

## 2021-11-04 PROCEDURE — 83036 HEMOGLOBIN GLYCOSYLATED A1C: CPT | Performed by: INTERNAL MEDICINE

## 2021-11-09 ENCOUNTER — PATIENT MESSAGE (OUTPATIENT)
Dept: FAMILY MEDICINE | Facility: CLINIC | Age: 56
End: 2021-11-09
Payer: COMMERCIAL

## 2021-11-09 DIAGNOSIS — E03.9 HYPOTHYROIDISM, ACQUIRED: Primary | ICD-10-CM

## 2021-11-09 DIAGNOSIS — E87.5 HYPERKALEMIA: ICD-10-CM

## 2021-11-11 DIAGNOSIS — E03.9 HYPOTHYROIDISM, UNSPECIFIED TYPE: Primary | ICD-10-CM

## 2021-11-11 RX ORDER — LEVOTHYROXINE SODIUM 137 UG/1
137 TABLET ORAL
Qty: 30 TABLET | Refills: 5 | Status: SHIPPED | OUTPATIENT
Start: 2021-11-11 | End: 2022-05-05 | Stop reason: SDUPTHER

## 2021-11-18 ENCOUNTER — HOSPITAL ENCOUNTER (OUTPATIENT)
Dept: RADIOLOGY | Facility: HOSPITAL | Age: 56
Discharge: HOME | End: 2021-11-18
Attending: INTERNAL MEDICINE
Payer: COMMERCIAL

## 2021-11-18 DIAGNOSIS — Z12.31 OTHER SCREENING MAMMOGRAM: ICD-10-CM

## 2021-11-18 PROCEDURE — 77067 SCR MAMMO BI INCL CAD: CPT | Mod: TC

## 2021-11-18 PROCEDURE — 77063 BREAST TOMOSYNTHESIS BI: CPT | Mod: 26,,, | Performed by: RADIOLOGY

## 2021-11-18 PROCEDURE — 77063 MAMMO DIGITAL SCREENING BILAT WITH TOMO: ICD-10-PCS | Mod: 26,,, | Performed by: RADIOLOGY

## 2021-11-18 PROCEDURE — 77067 MAMMO DIGITAL SCREENING BILAT WITH TOMO: ICD-10-PCS | Mod: 26,,, | Performed by: RADIOLOGY

## 2021-11-18 PROCEDURE — 77067 SCR MAMMO BI INCL CAD: CPT | Mod: 26,,, | Performed by: RADIOLOGY

## 2021-12-16 ENCOUNTER — PATIENT MESSAGE (OUTPATIENT)
Dept: FAMILY MEDICINE | Facility: CLINIC | Age: 56
End: 2021-12-16
Payer: COMMERCIAL

## 2021-12-27 ENCOUNTER — PATIENT MESSAGE (OUTPATIENT)
Dept: FAMILY MEDICINE | Facility: CLINIC | Age: 56
End: 2021-12-27
Payer: COMMERCIAL

## 2022-02-17 DIAGNOSIS — N95.1 HOT FLASH, MENOPAUSAL: ICD-10-CM

## 2022-02-18 RX ORDER — GABAPENTIN 100 MG/1
100 CAPSULE ORAL NIGHTLY
Qty: 30 CAPSULE | Refills: 2 | Status: SHIPPED | OUTPATIENT
Start: 2022-02-18 | End: 2022-05-05

## 2022-02-18 NOTE — TELEPHONE ENCOUNTER
No new care gaps identified.  Powered by [facility] [employer]. Reference number: [id number].   2/17/2022 10:09:43 PM CST

## 2022-05-02 ENCOUNTER — PATIENT MESSAGE (OUTPATIENT)
Dept: FAMILY MEDICINE | Facility: CLINIC | Age: 57
End: 2022-05-02
Payer: COMMERCIAL

## 2022-05-03 DIAGNOSIS — E87.5 HYPERKALEMIA: ICD-10-CM

## 2022-05-03 DIAGNOSIS — E03.9 HYPOTHYROIDISM, ACQUIRED: Primary | ICD-10-CM

## 2022-05-04 ENCOUNTER — PATIENT MESSAGE (OUTPATIENT)
Dept: FAMILY MEDICINE | Facility: CLINIC | Age: 57
End: 2022-05-04
Payer: COMMERCIAL

## 2022-05-05 ENCOUNTER — LAB VISIT (OUTPATIENT)
Dept: LAB | Facility: HOSPITAL | Age: 57
End: 2022-05-05
Attending: INTERNAL MEDICINE
Payer: COMMERCIAL

## 2022-05-05 ENCOUNTER — OFFICE VISIT (OUTPATIENT)
Dept: FAMILY MEDICINE | Facility: CLINIC | Age: 57
End: 2022-05-05
Payer: COMMERCIAL

## 2022-05-05 VITALS
WEIGHT: 185.88 LBS | BODY MASS INDEX: 30.93 KG/M2 | RESPIRATION RATE: 17 BRPM | OXYGEN SATURATION: 97 % | HEART RATE: 74 BPM | SYSTOLIC BLOOD PRESSURE: 124 MMHG | DIASTOLIC BLOOD PRESSURE: 66 MMHG

## 2022-05-05 DIAGNOSIS — E03.9 HYPOTHYROIDISM, UNSPECIFIED TYPE: ICD-10-CM

## 2022-05-05 DIAGNOSIS — E87.5 HYPERKALEMIA: ICD-10-CM

## 2022-05-05 DIAGNOSIS — R23.2 HOT FLASHES: Primary | ICD-10-CM

## 2022-05-05 DIAGNOSIS — Z00.00 HEALTHCARE MAINTENANCE: ICD-10-CM

## 2022-05-05 DIAGNOSIS — E03.9 HYPOTHYROIDISM, ACQUIRED: ICD-10-CM

## 2022-05-05 LAB
ALBUMIN SERPL BCP-MCNC: 4.1 G/DL (ref 3.5–5.2)
ALP SERPL-CCNC: 80 U/L (ref 55–135)
ALT SERPL W/O P-5'-P-CCNC: 19 U/L (ref 10–44)
ANION GAP SERPL CALC-SCNC: 9 MMOL/L (ref 8–16)
AST SERPL-CCNC: 21 U/L (ref 10–40)
BILIRUB SERPL-MCNC: 0.6 MG/DL (ref 0.1–1)
BUN SERPL-MCNC: 21 MG/DL (ref 6–20)
CALCIUM SERPL-MCNC: 9.9 MG/DL (ref 8.7–10.5)
CHLORIDE SERPL-SCNC: 105 MMOL/L (ref 95–110)
CO2 SERPL-SCNC: 27 MMOL/L (ref 23–29)
CREAT SERPL-MCNC: 1 MG/DL (ref 0.5–1.4)
EST. GFR  (AFRICAN AMERICAN): >60 ML/MIN/1.73 M^2
EST. GFR  (NON AFRICAN AMERICAN): >60 ML/MIN/1.73 M^2
GLUCOSE SERPL-MCNC: 80 MG/DL (ref 70–110)
POTASSIUM SERPL-SCNC: 5.4 MMOL/L (ref 3.5–5.1)
PROT SERPL-MCNC: 7.3 G/DL (ref 6–8.4)
SODIUM SERPL-SCNC: 141 MMOL/L (ref 136–145)
T4 FREE SERPL-MCNC: 1.2 NG/DL (ref 0.71–1.51)
TSH SERPL DL<=0.005 MIU/L-ACNC: 1.21 UIU/ML (ref 0.4–4)

## 2022-05-05 PROCEDURE — 3074F SYST BP LT 130 MM HG: CPT | Mod: CPTII,S$GLB,, | Performed by: INTERNAL MEDICINE

## 2022-05-05 PROCEDURE — 3078F PR MOST RECENT DIASTOLIC BLOOD PRESSURE < 80 MM HG: ICD-10-PCS | Mod: CPTII,S$GLB,, | Performed by: INTERNAL MEDICINE

## 2022-05-05 PROCEDURE — 84443 ASSAY THYROID STIM HORMONE: CPT | Performed by: INTERNAL MEDICINE

## 2022-05-05 PROCEDURE — 3008F PR BODY MASS INDEX (BMI) DOCUMENTED: ICD-10-PCS | Mod: CPTII,S$GLB,, | Performed by: INTERNAL MEDICINE

## 2022-05-05 PROCEDURE — 80053 COMPREHEN METABOLIC PANEL: CPT | Performed by: INTERNAL MEDICINE

## 2022-05-05 PROCEDURE — 99214 PR OFFICE/OUTPT VISIT, EST, LEVL IV, 30-39 MIN: ICD-10-PCS | Mod: S$GLB,,, | Performed by: INTERNAL MEDICINE

## 2022-05-05 PROCEDURE — 3008F BODY MASS INDEX DOCD: CPT | Mod: CPTII,S$GLB,, | Performed by: INTERNAL MEDICINE

## 2022-05-05 PROCEDURE — 1159F PR MEDICATION LIST DOCUMENTED IN MEDICAL RECORD: ICD-10-PCS | Mod: CPTII,S$GLB,, | Performed by: INTERNAL MEDICINE

## 2022-05-05 PROCEDURE — 99999 PR PBB SHADOW E&M-EST. PATIENT-LVL III: CPT | Mod: PBBFAC,,, | Performed by: INTERNAL MEDICINE

## 2022-05-05 PROCEDURE — 1159F MED LIST DOCD IN RCRD: CPT | Mod: CPTII,S$GLB,, | Performed by: INTERNAL MEDICINE

## 2022-05-05 PROCEDURE — 36415 COLL VENOUS BLD VENIPUNCTURE: CPT | Mod: PN | Performed by: INTERNAL MEDICINE

## 2022-05-05 PROCEDURE — 84439 ASSAY OF FREE THYROXINE: CPT | Performed by: INTERNAL MEDICINE

## 2022-05-05 PROCEDURE — 1160F PR REVIEW ALL MEDS BY PRESCRIBER/CLIN PHARMACIST DOCUMENTED: ICD-10-PCS | Mod: CPTII,S$GLB,, | Performed by: INTERNAL MEDICINE

## 2022-05-05 PROCEDURE — 99214 OFFICE O/P EST MOD 30 MIN: CPT | Mod: S$GLB,,, | Performed by: INTERNAL MEDICINE

## 2022-05-05 PROCEDURE — 99999 PR PBB SHADOW E&M-EST. PATIENT-LVL III: ICD-10-PCS | Mod: PBBFAC,,, | Performed by: INTERNAL MEDICINE

## 2022-05-05 PROCEDURE — 1160F RVW MEDS BY RX/DR IN RCRD: CPT | Mod: CPTII,S$GLB,, | Performed by: INTERNAL MEDICINE

## 2022-05-05 PROCEDURE — 3074F PR MOST RECENT SYSTOLIC BLOOD PRESSURE < 130 MM HG: ICD-10-PCS | Mod: CPTII,S$GLB,, | Performed by: INTERNAL MEDICINE

## 2022-05-05 PROCEDURE — 3078F DIAST BP <80 MM HG: CPT | Mod: CPTII,S$GLB,, | Performed by: INTERNAL MEDICINE

## 2022-05-05 RX ORDER — LEVOTHYROXINE SODIUM 137 UG/1
137 TABLET ORAL
Qty: 30 TABLET | Refills: 0 | Status: SHIPPED | OUTPATIENT
Start: 2022-05-05 | End: 2022-06-20 | Stop reason: SDUPTHER

## 2022-05-05 RX ORDER — GABAPENTIN 300 MG/1
300 CAPSULE ORAL NIGHTLY
Qty: 30 CAPSULE | Refills: 2 | Status: SHIPPED | OUTPATIENT
Start: 2022-05-05 | End: 2022-06-20 | Stop reason: SDUPTHER

## 2022-05-05 NOTE — PROGRESS NOTES
HISTORY OF PRESENT ILLNESS:  Mirta Perry is a 57 y.o. female who presents to the clinic today for follow up.    Last seen by me 11/2021.    Weight management  Notes her weight is down to 185 from 205 lbs.  Used Optiva (low carb plan).  Not exercising yet but has been improving her diet.    Hair loss  Hair loss has stopped.  Used Viviscal for about 3-4 months.  Feels hair density is improved.  Now on elderberry supplement.    Hypothyroidism  Managed with levothyroxine 137 mcg daily.    Night time hot flashes  Happens around 2 am every night.  Took gabapentin 100 mg qhs with inadequate relief.    PAST MEDICAL HISTORY:  Past Medical History:   Diagnosis Date    Acute pyelonephritis 07/03/2018    Fatigue     Fibroids     uterine    Hernia, ventral     s/p cholectectomy    Hypothyroidism, acquired     Migraines     Vitamin D deficiency     Weight gain        PAST SURGICAL HISTORY:  Past Surgical History:   Procedure Laterality Date    APPENDECTOMY Bilateral     tummy tuck    AUGMENTATION OF BREAST Bilateral     BREAST SURGERY Bilateral     augmentation    CARPAL TUNNEL RELEASE Right 05/2014    CHOLECYSTECTOMY      laparoscopic    COLONOSCOPY N/A 11/12/2019    Procedure: COLONOSCOPY;  Surgeon: Malick Rogers II, MD;  Location: [facility];  Service: Endoscopy;  Laterality: N/A;  confirmed appt-sp    gastric sleeve      HYSTERECTOMY      meniscal surgery  May 2014    left knee    OOPHORECTOMY      1 ov removed    rotator cuff surgery Right     TUBAL LIGATION      bitateral       SOCIAL HISTORY:  Social History     Socioeconomic History    Marital status:      Spouse name: michael    Number of children: 4    Years of education: college   Occupational History    Occupation:      Employer: [employer]   Tobacco Use    Smoking status: Never Smoker    Smokeless tobacco: Never Used   Substance and Sexual Activity    Alcohol use: No    Drug use: No    Sexual activity: Yes      Partners: Male   Social History Narrative    Adult Screenings updated and reviewed  6/5/14    Mammogram( for females) overdue     Pap ( for females)overdue    Colonoscopy age  49- not done yet     Flu shot yearly not done for 2013     Td ?    Pneumovax recommended one time  at age  64    Zostavax recommended one time at  age  59    Eye exam 2 years Bone density - not done          U/s thyroid  4/24/14       FAMILY HISTORY:  Family History   Problem Relation Age of Onset    Diabetes Mellitus Mother     Thyroid cancer Father         daughter     Parkinsonism Father     Heart disease Father     Breast cancer Maternal Grandmother     Breast cancer Paternal Grandmother        ALLERGIES AND MEDICATIONS: updated and reviewed.  Review of patient's allergies indicates:   Allergen Reactions    Ciprofloxacin     Percocet  [oxycodone-acetaminophen]      Other reaction(s): Hives     Medication List with Changes/Refills   Current Medications    GABAPENTIN (NEURONTIN) 100 MG CAPSULE    Take 1 capsule (100 mg total) by mouth every evening.    LEVOTHYROXINE (SYNTHROID) 137 MCG TAB TABLET    Take 1 tablet (137 mcg total) by mouth before breakfast.          CARE TEAM:  Patient Care Team:  Steve Quintanilla MD as PCP - General (Internal Medicine)  Deisy Wetzel MA as Care Coordinator         REVIEW OF SYSTEMS:  Review of Systems   Constitutional: Negative for chills and fever.   HENT: Negative for congestion and postnasal drip.    Eyes: Negative for photophobia and visual disturbance.   Respiratory: Negative for cough and shortness of breath.    Cardiovascular: Negative for chest pain and palpitations.   Gastrointestinal: Negative for abdominal pain, nausea and vomiting.   Genitourinary: Negative for dysuria and frequency.   Musculoskeletal: Negative for arthralgias and back pain.   Neurological: Negative for light-headedness and headaches.   Psychiatric/Behavioral: Negative for dysphoric mood and sleep disturbance. The  patient is not nervous/anxious.          PHYSICAL EXAM:   Vitals:    05/05/22 1101   BP: 124/66   Pulse: 74   Resp: 17             Body mass index is 30.93 kg/m².     General appearance - alert, well appearing, and in no distress and oriented to person, place, and time  Mental status - normal mood, behavior, speech, dress, motor activity, and thought processes  Eyes - sclera anicteric, left eye normal, right eye normal  Chest - clear to auscultation, no wheezes, rales or rhonchi, symmetric air entry, no tachypnea, retractions or cyanosis  Heart - normal rate, regular rhythm, normal S1, S2, no murmurs, rubs, clicks or gallops  Neurological - alert, oriented, normal speech, no focal findings or movement disorder noted, motor and sensory grossly normal bilaterally  Extremities - peripheral pulses normal, no pedal edema, no clubbing or cyanosis      ASSESSMENT AND PLAN:  Hot flashes  -     gabapentin (NEURONTIN) 300 MG capsule; Take 1 capsule (300 mg total) by mouth every evening.  Dispense: 30 capsule; Refill: 2    Hypothyroidism, unspecified type  -     levothyroxine (SYNTHROID) 137 MCG Tab tablet; Take 1 tablet (137 mcg total) by mouth before breakfast.  Dispense: 30 tablet; Refill: 0    Healthcare maintenance  -     Hemoglobin A1C; Future; Expected date: 05/05/2022  -     Comprehensive Metabolic Panel; Future; Expected date: 05/05/2022  -     Lipid Panel; Future; Expected date: 05/05/2022  -     CBC Auto Differential; Future; Expected date: 05/05/2022  -     TSH; Future; Expected date: 05/05/2022  -     T4, Free; Future; Expected date: 05/05/2022             Follow up 6 months or sooner as needed.

## 2022-06-29 NOTE — ED NOTES
Verbal report given to Jennifer MORALES [id number]. Pt. Updated on room status, awaiting transportation to floor.   Statement Selected

## 2022-08-24 ENCOUNTER — PATIENT MESSAGE (OUTPATIENT)
Dept: FAMILY MEDICINE | Facility: CLINIC | Age: 57
End: 2022-08-24
Payer: COMMERCIAL

## 2022-11-07 ENCOUNTER — LAB VISIT (OUTPATIENT)
Dept: LAB | Facility: HOSPITAL | Age: 57
End: 2022-11-07
Attending: INTERNAL MEDICINE
Payer: COMMERCIAL

## 2022-11-07 DIAGNOSIS — E03.9 HYPOTHYROIDISM, ACQUIRED: ICD-10-CM

## 2022-11-07 DIAGNOSIS — E87.5 HYPERKALEMIA: ICD-10-CM

## 2022-11-07 DIAGNOSIS — Z00.00 HEALTHCARE MAINTENANCE: ICD-10-CM

## 2022-11-07 LAB
ALBUMIN SERPL BCP-MCNC: 4.1 G/DL (ref 3.5–5.2)
ALP SERPL-CCNC: 84 U/L (ref 55–135)
ALT SERPL W/O P-5'-P-CCNC: 14 U/L (ref 10–44)
ANION GAP SERPL CALC-SCNC: 7 MMOL/L (ref 8–16)
ANION GAP SERPL CALC-SCNC: 7 MMOL/L (ref 8–16)
AST SERPL-CCNC: 19 U/L (ref 10–40)
BASOPHILS # BLD AUTO: 0.06 K/UL (ref 0–0.2)
BASOPHILS NFR BLD: 1.2 % (ref 0–1.9)
BILIRUB SERPL-MCNC: 0.7 MG/DL (ref 0.1–1)
BUN SERPL-MCNC: 17 MG/DL (ref 6–20)
BUN SERPL-MCNC: 17 MG/DL (ref 6–20)
CALCIUM SERPL-MCNC: 10.2 MG/DL (ref 8.7–10.5)
CALCIUM SERPL-MCNC: 10.2 MG/DL (ref 8.7–10.5)
CHLORIDE SERPL-SCNC: 109 MMOL/L (ref 95–110)
CHLORIDE SERPL-SCNC: 109 MMOL/L (ref 95–110)
CHOLEST SERPL-MCNC: 208 MG/DL (ref 120–199)
CHOLEST/HDLC SERPL: 4.5 {RATIO} (ref 2–5)
CO2 SERPL-SCNC: 29 MMOL/L (ref 23–29)
CO2 SERPL-SCNC: 29 MMOL/L (ref 23–29)
CREAT SERPL-MCNC: 1 MG/DL (ref 0.5–1.4)
CREAT SERPL-MCNC: 1 MG/DL (ref 0.5–1.4)
DIFFERENTIAL METHOD: NORMAL
EOSINOPHIL # BLD AUTO: 0.2 K/UL (ref 0–0.5)
EOSINOPHIL NFR BLD: 3.4 % (ref 0–8)
ERYTHROCYTE [DISTWIDTH] IN BLOOD BY AUTOMATED COUNT: 11.9 % (ref 11.5–14.5)
EST. GFR  (NO RACE VARIABLE): >60 ML/MIN/1.73 M^2
EST. GFR  (NO RACE VARIABLE): >60 ML/MIN/1.73 M^2
ESTIMATED AVG GLUCOSE: 105 MG/DL (ref 68–131)
GLUCOSE SERPL-MCNC: 92 MG/DL (ref 70–110)
GLUCOSE SERPL-MCNC: 92 MG/DL (ref 70–110)
HBA1C MFR BLD: 5.3 % (ref 4–5.6)
HCT VFR BLD AUTO: 41 % (ref 37–48.5)
HDLC SERPL-MCNC: 46 MG/DL (ref 40–75)
HDLC SERPL: 22.1 % (ref 20–50)
HGB BLD-MCNC: 13.5 G/DL (ref 12–16)
IMM GRANULOCYTES # BLD AUTO: 0.01 K/UL (ref 0–0.04)
IMM GRANULOCYTES NFR BLD AUTO: 0.2 % (ref 0–0.5)
LDLC SERPL CALC-MCNC: 142.6 MG/DL (ref 63–159)
LYMPHOCYTES # BLD AUTO: 1.4 K/UL (ref 1–4.8)
LYMPHOCYTES NFR BLD: 28 % (ref 18–48)
MCH RBC QN AUTO: 29.8 PG (ref 27–31)
MCHC RBC AUTO-ENTMCNC: 32.9 G/DL (ref 32–36)
MCV RBC AUTO: 91 FL (ref 82–98)
MONOCYTES # BLD AUTO: 0.4 K/UL (ref 0.3–1)
MONOCYTES NFR BLD: 7.9 % (ref 4–15)
NEUTROPHILS # BLD AUTO: 3 K/UL (ref 1.8–7.7)
NEUTROPHILS NFR BLD: 59.3 % (ref 38–73)
NONHDLC SERPL-MCNC: 162 MG/DL
NRBC BLD-RTO: 0 /100 WBC
PLATELET # BLD AUTO: 284 K/UL (ref 150–450)
PMV BLD AUTO: 10.4 FL (ref 9.2–12.9)
POTASSIUM SERPL-SCNC: 5.1 MMOL/L (ref 3.5–5.1)
POTASSIUM SERPL-SCNC: 5.1 MMOL/L (ref 3.5–5.1)
PROT SERPL-MCNC: 7.3 G/DL (ref 6–8.4)
RBC # BLD AUTO: 4.53 M/UL (ref 4–5.4)
SODIUM SERPL-SCNC: 145 MMOL/L (ref 136–145)
SODIUM SERPL-SCNC: 145 MMOL/L (ref 136–145)
T4 FREE SERPL-MCNC: 1.18 NG/DL (ref 0.71–1.51)
T4 FREE SERPL-MCNC: 1.18 NG/DL (ref 0.71–1.51)
TRIGL SERPL-MCNC: 97 MG/DL (ref 30–150)
TSH SERPL DL<=0.005 MIU/L-ACNC: 1.29 UIU/ML (ref 0.4–4)
TSH SERPL DL<=0.005 MIU/L-ACNC: 1.29 UIU/ML (ref 0.4–4)
WBC # BLD AUTO: 5.07 K/UL (ref 3.9–12.7)

## 2022-11-07 PROCEDURE — 84439 ASSAY OF FREE THYROXINE: CPT | Performed by: INTERNAL MEDICINE

## 2022-11-07 PROCEDURE — 84443 ASSAY THYROID STIM HORMONE: CPT | Performed by: INTERNAL MEDICINE

## 2022-11-07 PROCEDURE — 85025 COMPLETE CBC W/AUTO DIFF WBC: CPT | Performed by: INTERNAL MEDICINE

## 2022-11-07 PROCEDURE — 80053 COMPREHEN METABOLIC PANEL: CPT | Performed by: INTERNAL MEDICINE

## 2022-11-07 PROCEDURE — 36415 COLL VENOUS BLD VENIPUNCTURE: CPT | Mod: PN | Performed by: INTERNAL MEDICINE

## 2022-11-07 PROCEDURE — 80061 LIPID PANEL: CPT | Performed by: INTERNAL MEDICINE

## 2022-11-07 PROCEDURE — 83036 HEMOGLOBIN GLYCOSYLATED A1C: CPT | Performed by: INTERNAL MEDICINE

## 2022-11-14 ENCOUNTER — TELEPHONE (OUTPATIENT)
Dept: FAMILY MEDICINE | Facility: CLINIC | Age: 57
End: 2022-11-14

## 2022-11-14 NOTE — TELEPHONE ENCOUNTER
Patient stated that she has a stomach virus and cancelled appointment on today. Patient stated that she has enough of medication for the rest of year. Patient couldn't get an appointment until April 2023, patient is on cancellation list. Patient state that she may need a medication refill prior to her appointment. Patient stated she will call back for medication refill.

## 2022-11-14 NOTE — TELEPHONE ENCOUNTER
----- Message from Rosy Solis sent at 11/14/2022  8:27 AM CST -----  Type: Patient Call Back    Who called: self     What is the request in detail: patient reschedule her appt that was schedule on 11/14 next available appot 4/2023. Patient would like to know if Dr Quintanilla will refill her script. Please call    Can the clinic reply by [facility]? No     Would the patient rather a call back or a response via [facility]? call    Best call back number:.[phone] (home)

## 2022-12-21 DIAGNOSIS — Z12.31 OTHER SCREENING MAMMOGRAM: ICD-10-CM

## 2023-07-18 ENCOUNTER — HOSPITAL ENCOUNTER (OUTPATIENT)
Dept: RADIOLOGY | Facility: HOSPITAL | Age: 58
Discharge: HOME | End: 2023-07-18
Attending: INTERNAL MEDICINE
Payer: COMMERCIAL

## 2023-07-18 DIAGNOSIS — Z12.31 OTHER SCREENING MAMMOGRAM: ICD-10-CM

## 2023-07-18 PROCEDURE — 77063 BREAST TOMOSYNTHESIS BI: CPT | Mod: 26,,, | Performed by: RADIOLOGY

## 2023-07-18 PROCEDURE — 77067 SCR MAMMO BI INCL CAD: CPT | Mod: TC

## 2023-07-18 PROCEDURE — 77063 MAMMO DIGITAL SCREENING BILAT WITH TOMO: ICD-10-PCS | Mod: 26,,, | Performed by: RADIOLOGY

## 2023-07-18 PROCEDURE — 77067 MAMMO DIGITAL SCREENING BILAT WITH TOMO: ICD-10-PCS | Mod: 26,,, | Performed by: RADIOLOGY

## 2023-07-18 PROCEDURE — 77067 SCR MAMMO BI INCL CAD: CPT | Mod: 26,,, | Performed by: RADIOLOGY

## 2023-07-20 ENCOUNTER — PATIENT OUTREACH (OUTPATIENT)
Dept: ADMINISTRATIVE | Facility: HOSPITAL | Age: 58
End: 2023-07-20
Payer: COMMERCIAL

## 2023-09-25 ENCOUNTER — PATIENT OUTREACH (OUTPATIENT)
Dept: ADMINISTRATIVE | Facility: HOSPITAL | Age: 58
End: 2023-09-25
Payer: COMMERCIAL

## 2023-12-05 ENCOUNTER — OFFICE VISIT (OUTPATIENT)
Dept: FAMILY MEDICINE | Facility: CLINIC | Age: 58
End: 2023-12-05
Payer: COMMERCIAL

## 2023-12-05 ENCOUNTER — PATIENT MESSAGE (OUTPATIENT)
Dept: FAMILY MEDICINE | Facility: CLINIC | Age: 58
End: 2023-12-05

## 2023-12-05 VITALS
DIASTOLIC BLOOD PRESSURE: 74 MMHG | TEMPERATURE: 98 F | WEIGHT: 193.81 LBS | OXYGEN SATURATION: 98 % | BODY MASS INDEX: 32.29 KG/M2 | HEIGHT: 65 IN | SYSTOLIC BLOOD PRESSURE: 124 MMHG | HEART RATE: 64 BPM

## 2023-12-05 DIAGNOSIS — E03.9 HYPOTHYROIDISM, ACQUIRED: ICD-10-CM

## 2023-12-05 DIAGNOSIS — R23.2 HOT FLASHES: ICD-10-CM

## 2023-12-05 DIAGNOSIS — E66.09 CLASS 1 OBESITY DUE TO EXCESS CALORIES WITHOUT SERIOUS COMORBIDITY WITH BODY MASS INDEX (BMI) OF 32.0 TO 32.9 IN ADULT: Primary | ICD-10-CM

## 2023-12-05 DIAGNOSIS — Z00.00 HEALTHCARE MAINTENANCE: ICD-10-CM

## 2023-12-05 DIAGNOSIS — E55.9 VITAMIN D DEFICIENCY DISEASE: ICD-10-CM

## 2023-12-05 DIAGNOSIS — E03.9 HYPOTHYROIDISM, UNSPECIFIED TYPE: ICD-10-CM

## 2023-12-05 PROBLEM — Z87.448 HISTORY OF ACUTE PYELONEPHRITIS: Status: ACTIVE | Noted: 2023-12-05

## 2023-12-05 PROCEDURE — 1160F PR REVIEW ALL MEDS BY PRESCRIBER/CLIN PHARMACIST DOCUMENTED: ICD-10-PCS | Mod: CPTII,S$GLB,, | Performed by: INTERNAL MEDICINE

## 2023-12-05 PROCEDURE — 99214 PR OFFICE/OUTPT VISIT, EST, LEVL IV, 30-39 MIN: ICD-10-PCS | Mod: S$GLB,,, | Performed by: INTERNAL MEDICINE

## 2023-12-05 PROCEDURE — 3078F DIAST BP <80 MM HG: CPT | Mod: CPTII,S$GLB,, | Performed by: INTERNAL MEDICINE

## 2023-12-05 PROCEDURE — 3008F BODY MASS INDEX DOCD: CPT | Mod: CPTII,S$GLB,, | Performed by: INTERNAL MEDICINE

## 2023-12-05 PROCEDURE — 3074F PR MOST RECENT SYSTOLIC BLOOD PRESSURE < 130 MM HG: ICD-10-PCS | Mod: CPTII,S$GLB,, | Performed by: INTERNAL MEDICINE

## 2023-12-05 PROCEDURE — 3078F PR MOST RECENT DIASTOLIC BLOOD PRESSURE < 80 MM HG: ICD-10-PCS | Mod: CPTII,S$GLB,, | Performed by: INTERNAL MEDICINE

## 2023-12-05 PROCEDURE — 99214 OFFICE O/P EST MOD 30 MIN: CPT | Mod: S$GLB,,, | Performed by: INTERNAL MEDICINE

## 2023-12-05 PROCEDURE — 1160F RVW MEDS BY RX/DR IN RCRD: CPT | Mod: CPTII,S$GLB,, | Performed by: INTERNAL MEDICINE

## 2023-12-05 PROCEDURE — 1159F PR MEDICATION LIST DOCUMENTED IN MEDICAL RECORD: ICD-10-PCS | Mod: CPTII,S$GLB,, | Performed by: INTERNAL MEDICINE

## 2023-12-05 PROCEDURE — 3074F SYST BP LT 130 MM HG: CPT | Mod: CPTII,S$GLB,, | Performed by: INTERNAL MEDICINE

## 2023-12-05 PROCEDURE — 3008F PR BODY MASS INDEX (BMI) DOCUMENTED: ICD-10-PCS | Mod: CPTII,S$GLB,, | Performed by: INTERNAL MEDICINE

## 2023-12-05 PROCEDURE — 99999 PR PBB SHADOW E&M-EST. PATIENT-LVL IV: CPT | Mod: PBBFAC,,, | Performed by: INTERNAL MEDICINE

## 2023-12-05 PROCEDURE — 1159F MED LIST DOCD IN RCRD: CPT | Mod: CPTII,S$GLB,, | Performed by: INTERNAL MEDICINE

## 2023-12-05 PROCEDURE — 99999 PR PBB SHADOW E&M-EST. PATIENT-LVL IV: ICD-10-PCS | Mod: PBBFAC,,, | Performed by: INTERNAL MEDICINE

## 2023-12-05 RX ORDER — GABAPENTIN 300 MG/1
300 CAPSULE ORAL NIGHTLY
Qty: 30 CAPSULE | Refills: 2 | Status: SHIPPED | OUTPATIENT
Start: 2023-12-05 | End: 2024-03-04

## 2023-12-05 RX ORDER — SEMAGLUTIDE 0.5 MG/.5ML
0.5 INJECTION, SOLUTION SUBCUTANEOUS
Qty: 2 ML | Refills: 0 | Status: SHIPPED | OUTPATIENT
Start: 2024-01-05 | End: 2023-12-07

## 2023-12-05 RX ORDER — SEMAGLUTIDE 0.25 MG/.5ML
0.25 INJECTION, SOLUTION SUBCUTANEOUS
Qty: 2 ML | Refills: 0 | Status: SHIPPED | OUTPATIENT
Start: 2023-12-05 | End: 2023-12-07

## 2023-12-05 RX ORDER — SEMAGLUTIDE 1 MG/.5ML
1 INJECTION, SOLUTION SUBCUTANEOUS
Qty: 2 ML | Refills: 5 | Status: SHIPPED | OUTPATIENT
Start: 2024-02-05 | End: 2023-12-07

## 2023-12-05 RX ORDER — LEVOTHYROXINE SODIUM 137 UG/1
137 TABLET ORAL
Qty: 90 TABLET | Refills: 0 | Status: SHIPPED | OUTPATIENT
Start: 2023-12-05 | End: 2023-12-07

## 2023-12-05 NOTE — PROGRESS NOTES
HISTORY OF PRESENT ILLNESS:  Mirta Perry is a 58 y.o. female who presents to the clinic today for weight management concerns.    Last seen by me 5/2022.    Planning to see eye doctor - Dr. Argueta.    Weight management  Notes undesired weight change.  5/2014 weight was 219.  7/2018 was 170 lb.  Reports lowest weight was 155 lb after sleeve gastrectomy.  Today is 193 lb.  BMI 32.25.  Eating:  Low carb diet during the week and normal diet on the weekend.  Notes she does eat sweets on the weekends - Dessert if eats out.  Physical activity:  Three times per week.  Walk 30 min.    PAST MEDICAL HISTORY:  Past Medical History:   Diagnosis Date    Acute pyelonephritis 07/03/2018    Fatigue     Fibroids     uterine    Hernia, ventral     s/p cholectectomy    Hypothyroidism, acquired     Migraines     Vitamin D deficiency     Weight gain        PAST SURGICAL HISTORY:  Past Surgical History:   Procedure Laterality Date    APPENDECTOMY Bilateral     tummy tuck    AUGMENTATION OF BREAST Bilateral     BREAST SURGERY Bilateral     augmentation    CARPAL TUNNEL RELEASE Right 05/2014    CHOLECYSTECTOMY      laparoscopic    COLONOSCOPY N/A 11/12/2019    Procedure: COLONOSCOPY;  Surgeon: Malick Rogers II, MD;  Location: [facility];  Service: Endoscopy;  Laterality: N/A;  confirmed appt-sp    gastric sleeve      HYSTERECTOMY      meniscal surgery  May 2014    left knee    OOPHORECTOMY      1 ov removed    rotator cuff surgery Right     TUBAL LIGATION      bitateral       SOCIAL HISTORY:  Social History     Socioeconomic History    Marital status:      Spouse name: michael    Number of children: 4    Years of education: college   Occupational History    Occupation:      Employer: [employer]   Tobacco Use    Smoking status: Never    Smokeless tobacco: Never   Substance and Sexual Activity    Alcohol use: No    Drug use: No    Sexual activity: Yes     Partners: Male   Social History Narrative    Adult  Screenings updated and reviewed  6/5/14    Mammogram( for females) overdue     Pap ( for females)overdue    Colonoscopy age  49- not done yet     Flu shot yearly not done for 2013     Td ?    Pneumovax recommended one time  at age  64    Zostavax recommended one time at  age  59    Eye exam 2 years Bone density - not done          U/s thyroid  4/24/14       FAMILY HISTORY:  Family History   Problem Relation Age of Onset    Diabetes Mellitus Mother     Thyroid cancer Father         daughter     Parkinsonism Father     Heart disease Father     Breast cancer Maternal Grandmother     Breast cancer Paternal Grandmother        ALLERGIES AND MEDICATIONS: updated and reviewed.  Review of patient's allergies indicates:   Allergen Reactions    Ciprofloxacin     Percocet  [oxycodone-acetaminophen]      Other reaction(s): Hives     Medication List with Changes/Refills   Current Medications    GABAPENTIN (NEURONTIN) 300 MG CAPSULE    Take 1 capsule (300 mg total) by mouth every evening.    LEVOTHYROXINE (SYNTHROID) 137 MCG TAB TABLET    Take 1 tablet (137 mcg total) by mouth before breakfast.          CARE TEAM:  Patient Care Team:  Steve Quintanilla MD as PCP - General (Internal Medicine)         REVIEW OF SYSTEMS:  Review of Systems   Constitutional:  Positive for unexpected weight change. Negative for activity change.   HENT:  Negative for hearing loss, rhinorrhea and trouble swallowing.    Eyes:  Positive for visual disturbance. Negative for discharge.   Respiratory:  Negative for chest tightness and wheezing.    Cardiovascular:  Negative for chest pain and palpitations.   Gastrointestinal:  Negative for blood in stool, constipation, diarrhea and vomiting.   Endocrine: Negative for polydipsia and polyuria.   Genitourinary:  Negative for difficulty urinating, dysuria, hematuria and menstrual problem.   Musculoskeletal:  Negative for arthralgias, joint swelling and neck pain.   Neurological:  Negative for weakness and  headaches.   Psychiatric/Behavioral:  Negative for confusion and dysphoric mood.          PHYSICAL EXAM:   Vitals:    12/05/23 1134   BP: 124/74   Pulse: 64   Temp: 98.2 °F (36.8 °C)             Body mass index is 32.25 kg/m².     General appearance - alert, well appearing, and in no distress and obese  Mental status - normal mood, behavior, speech, dress, motor activity, and thought processes  Eyes - sclera anicteric, left eye normal, right eye normal  Chest - no tachypnea, retractions or cyanosis  Neurological - alert, oriented, normal speech, no focal findings or movement disorder noted  Musculoskeletal - no joint tenderness, deformity or swelling  Extremities - no pedal edema, no clubbing or cyanosis      ASSESSMENT AND PLAN:  Class 1 obesity due to excess calories without serious comorbidity with body mass index (BMI) of 32.0 to 32.9 in adult  -     semaglutide, weight loss, (WEGOVY) 0.25 mg/0.5 mL PnIj; Inject 0.25 mg (one pen) into the skin every 7 days.  Dispense: 2 mL; Refill: 0  -     semaglutide, weight loss, (WEGOVY) 0.5 mg/0.5 mL PnIj; Inject 0.5 mg (one pen) into the skin every 7 days.  Dispense: 2 mL; Refill: 0  -     semaglutide, weight loss, (WEGOVY) 1 mg/0.5 mL PnIj; Inject 1 mg (one pen) into the skin every 7 days.  Dispense: 2 mL; Refill: 5  - We discussed in detail steps toward healthy eating as well as increased physical activity.  - Discussed potential side effects of weight loss medication options.  - If she is unable to obtain Wegovy, then will consider Rx for Contrave vs Qsymia.    Hypothyroidism, acquired  -     TSH; Future; Expected date: 12/05/2023  -     T4, Free; Future; Expected date: 12/05/2023    Healthcare maintenance  -     Hemoglobin A1C; Future; Expected date: 12/05/2023  -     Comprehensive Metabolic Panel; Future; Expected date: 12/05/2023  -     Lipid Panel; Future; Expected date: 12/05/2023  -     CBC Auto Differential; Future; Expected date: 12/05/2023  -     TSH; Future;  Expected date: 12/05/2023  -     Vitamin D; Future; Expected date: 12/05/2023  -     HIV 1/2 Ag/Ab (4th Gen); Future; Expected date: 12/05/2023  -     T4, Free; Future; Expected date: 12/05/2023    Vitamin D deficiency disease  -     Vitamin D; Future; Expected date: 12/05/2023    Hypothyroidism, unspecified type  -     levothyroxine (SYNTHROID) 137 MCG Tab tablet; Take 1 tablet (137 mcg total) by mouth daily before breakfast.  Dispense: 90 tablet; Refill: 0    Hot flashes  -     gabapentin (NEURONTIN) 300 MG capsule; Take 1 capsule (300 mg total) by mouth every evening.  Dispense: 30 capsule; Refill: 2              Follow up 3 months or sooner as needed.

## 2023-12-06 ENCOUNTER — LAB VISIT (OUTPATIENT)
Dept: LAB | Facility: HOSPITAL | Age: 58
End: 2023-12-06
Attending: INTERNAL MEDICINE
Payer: COMMERCIAL

## 2023-12-06 DIAGNOSIS — Z00.00 HEALTHCARE MAINTENANCE: ICD-10-CM

## 2023-12-06 DIAGNOSIS — E03.9 HYPOTHYROIDISM, ACQUIRED: ICD-10-CM

## 2023-12-06 DIAGNOSIS — E55.9 VITAMIN D DEFICIENCY DISEASE: ICD-10-CM

## 2023-12-06 LAB
ALBUMIN SERPL BCP-MCNC: 4.2 G/DL (ref 3.5–5.2)
ALP SERPL-CCNC: 84 U/L (ref 55–135)
ALT SERPL W/O P-5'-P-CCNC: 20 U/L (ref 10–44)
ANION GAP SERPL CALC-SCNC: 11 MMOL/L (ref 8–16)
AST SERPL-CCNC: 25 U/L (ref 10–40)
BASOPHILS # BLD AUTO: 0.06 K/UL (ref 0–0.2)
BASOPHILS NFR BLD: 0.8 % (ref 0–1.9)
BILIRUB SERPL-MCNC: 0.6 MG/DL (ref 0.1–1)
BUN SERPL-MCNC: 17 MG/DL (ref 6–20)
CALCIUM SERPL-MCNC: 10.1 MG/DL (ref 8.7–10.5)
CHLORIDE SERPL-SCNC: 107 MMOL/L (ref 95–110)
CHOLEST SERPL-MCNC: 254 MG/DL (ref 120–199)
CHOLEST/HDLC SERPL: 4.5 {RATIO} (ref 2–5)
CO2 SERPL-SCNC: 24 MMOL/L (ref 23–29)
CREAT SERPL-MCNC: 0.9 MG/DL (ref 0.5–1.4)
DIFFERENTIAL METHOD: NORMAL
EOSINOPHIL # BLD AUTO: 0.2 K/UL (ref 0–0.5)
EOSINOPHIL NFR BLD: 2.7 % (ref 0–8)
ERYTHROCYTE [DISTWIDTH] IN BLOOD BY AUTOMATED COUNT: 12 % (ref 11.5–14.5)
EST. GFR  (NO RACE VARIABLE): >60 ML/MIN/1.73 M^2
GLUCOSE SERPL-MCNC: 87 MG/DL (ref 70–110)
HCT VFR BLD AUTO: 42.6 % (ref 37–48.5)
HDLC SERPL-MCNC: 57 MG/DL (ref 40–75)
HDLC SERPL: 22.4 % (ref 20–50)
HGB BLD-MCNC: 13.9 G/DL (ref 12–16)
IMM GRANULOCYTES # BLD AUTO: 0.02 K/UL (ref 0–0.04)
IMM GRANULOCYTES NFR BLD AUTO: 0.3 % (ref 0–0.5)
LDLC SERPL CALC-MCNC: 184 MG/DL (ref 63–159)
LYMPHOCYTES # BLD AUTO: 2.1 K/UL (ref 1–4.8)
LYMPHOCYTES NFR BLD: 28.1 % (ref 18–48)
MCH RBC QN AUTO: 29.1 PG (ref 27–31)
MCHC RBC AUTO-ENTMCNC: 32.6 G/DL (ref 32–36)
MCV RBC AUTO: 89 FL (ref 82–98)
MONOCYTES # BLD AUTO: 0.5 K/UL (ref 0.3–1)
MONOCYTES NFR BLD: 6.2 % (ref 4–15)
NEUTROPHILS # BLD AUTO: 4.6 K/UL (ref 1.8–7.7)
NEUTROPHILS NFR BLD: 61.9 % (ref 38–73)
NONHDLC SERPL-MCNC: 197 MG/DL
NRBC BLD-RTO: 0 /100 WBC
PLATELET # BLD AUTO: 312 K/UL (ref 150–450)
PMV BLD AUTO: 9.3 FL (ref 9.2–12.9)
POTASSIUM SERPL-SCNC: 4.7 MMOL/L (ref 3.5–5.1)
PROT SERPL-MCNC: 7.6 G/DL (ref 6–8.4)
RBC # BLD AUTO: 4.78 M/UL (ref 4–5.4)
SODIUM SERPL-SCNC: 142 MMOL/L (ref 136–145)
T4 FREE SERPL-MCNC: 0.74 NG/DL (ref 0.71–1.51)
TRIGL SERPL-MCNC: 65 MG/DL (ref 30–150)
TSH SERPL DL<=0.005 MIU/L-ACNC: 9.23 UIU/ML (ref 0.4–4)
WBC # BLD AUTO: 7.39 K/UL (ref 3.9–12.7)

## 2023-12-06 PROCEDURE — 83036 HEMOGLOBIN GLYCOSYLATED A1C: CPT | Performed by: INTERNAL MEDICINE

## 2023-12-06 PROCEDURE — 80053 COMPREHEN METABOLIC PANEL: CPT | Performed by: INTERNAL MEDICINE

## 2023-12-06 PROCEDURE — 87389 HIV-1 AG W/HIV-1&-2 AB AG IA: CPT | Performed by: INTERNAL MEDICINE

## 2023-12-06 PROCEDURE — 80061 LIPID PANEL: CPT | Performed by: INTERNAL MEDICINE

## 2023-12-06 PROCEDURE — 84439 ASSAY OF FREE THYROXINE: CPT | Performed by: INTERNAL MEDICINE

## 2023-12-06 PROCEDURE — 85025 COMPLETE CBC W/AUTO DIFF WBC: CPT | Performed by: INTERNAL MEDICINE

## 2023-12-06 PROCEDURE — 36415 COLL VENOUS BLD VENIPUNCTURE: CPT | Performed by: INTERNAL MEDICINE

## 2023-12-06 PROCEDURE — 82306 VITAMIN D 25 HYDROXY: CPT | Performed by: INTERNAL MEDICINE

## 2023-12-06 PROCEDURE — 84443 ASSAY THYROID STIM HORMONE: CPT | Performed by: INTERNAL MEDICINE

## 2023-12-07 DIAGNOSIS — E03.9 HYPOTHYROIDISM, UNSPECIFIED TYPE: ICD-10-CM

## 2023-12-07 DIAGNOSIS — Z76.89 ENCOUNTER FOR WEIGHT MANAGEMENT: ICD-10-CM

## 2023-12-07 DIAGNOSIS — E66.09 CLASS 1 OBESITY DUE TO EXCESS CALORIES WITHOUT SERIOUS COMORBIDITY WITH BODY MASS INDEX (BMI) OF 32.0 TO 32.9 IN ADULT: Primary | ICD-10-CM

## 2023-12-07 LAB
25(OH)D3+25(OH)D2 SERPL-MCNC: 27 NG/ML (ref 30–96)
ESTIMATED AVG GLUCOSE: 108 MG/DL (ref 68–131)
HBA1C MFR BLD: 5.4 % (ref 4–5.6)
HIV 1+2 AB+HIV1 P24 AG SERPL QL IA: NORMAL

## 2023-12-07 RX ORDER — LEVOTHYROXINE SODIUM 137 UG/1
137 TABLET ORAL
Qty: 30 TABLET | Refills: 11 | Status: SHIPPED | OUTPATIENT
Start: 2023-12-07 | End: 2024-02-06

## 2023-12-07 RX ORDER — NALTREXONE HYDROCHLORIDE AND BUPROPION HYDROCHLORIDE 8; 90 MG/1; MG/1
TABLET, EXTENDED RELEASE ORAL
Qty: 120 TABLET | Refills: 1 | Status: SHIPPED | OUTPATIENT
Start: 2023-12-07 | End: 2024-03-12

## 2023-12-12 ENCOUNTER — PATIENT MESSAGE (OUTPATIENT)
Dept: FAMILY MEDICINE | Facility: CLINIC | Age: 58
End: 2023-12-12
Payer: COMMERCIAL

## 2023-12-12 DIAGNOSIS — E03.9 HYPOTHYROIDISM, ACQUIRED: Primary | ICD-10-CM

## 2024-02-01 ENCOUNTER — LAB VISIT (OUTPATIENT)
Dept: LAB | Facility: HOSPITAL | Age: 59
End: 2024-02-01
Attending: INTERNAL MEDICINE
Payer: COMMERCIAL

## 2024-02-01 DIAGNOSIS — E03.9 HYPOTHYROIDISM, UNSPECIFIED TYPE: ICD-10-CM

## 2024-02-01 LAB
T4 FREE SERPL-MCNC: 1.36 NG/DL (ref 0.71–1.51)
TSH SERPL DL<=0.005 MIU/L-ACNC: 0.07 UIU/ML (ref 0.4–4)

## 2024-02-01 PROCEDURE — 36415 COLL VENOUS BLD VENIPUNCTURE: CPT | Mod: PN | Performed by: INTERNAL MEDICINE

## 2024-02-01 PROCEDURE — 84443 ASSAY THYROID STIM HORMONE: CPT | Performed by: INTERNAL MEDICINE

## 2024-02-01 PROCEDURE — 84439 ASSAY OF FREE THYROXINE: CPT | Performed by: INTERNAL MEDICINE

## 2024-02-06 DIAGNOSIS — E03.9 HYPOTHYROIDISM, UNSPECIFIED TYPE: Primary | ICD-10-CM

## 2024-02-06 RX ORDER — LEVOTHYROXINE SODIUM 125 UG/1
125 TABLET ORAL
Qty: 30 TABLET | Refills: 11 | Status: SHIPPED | OUTPATIENT
Start: 2024-02-06 | End: 2024-03-05

## 2024-02-12 ENCOUNTER — TELEPHONE (OUTPATIENT)
Dept: FAMILY MEDICINE | Facility: CLINIC | Age: 59
End: 2024-02-12
Payer: COMMERCIAL

## 2024-02-12 NOTE — TELEPHONE ENCOUNTER
----- Message from Steve Quintanilla MD sent at 2/6/2024  5:43 PM CST -----  Call to schedule 8 wk non fasting lab and advise:    Thyroid labs are abnormal.  We need to reduce the dose at this time.  New prescription for Synthroid 125 mcg daily has been sent and let's recheck in 8 weeks.

## 2024-02-27 ENCOUNTER — PATIENT MESSAGE (OUTPATIENT)
Dept: FAMILY MEDICINE | Facility: CLINIC | Age: 59
End: 2024-02-27
Payer: COMMERCIAL

## 2024-02-27 DIAGNOSIS — E66.09 CLASS 1 OBESITY DUE TO EXCESS CALORIES WITHOUT SERIOUS COMORBIDITY WITH BODY MASS INDEX (BMI) OF 32.0 TO 32.9 IN ADULT: Primary | ICD-10-CM

## 2024-02-27 DIAGNOSIS — E03.9 HYPOTHYROIDISM, UNSPECIFIED TYPE: ICD-10-CM

## 2024-02-29 ENCOUNTER — LAB VISIT (OUTPATIENT)
Dept: LAB | Facility: HOSPITAL | Age: 59
End: 2024-02-29
Attending: INTERNAL MEDICINE
Payer: COMMERCIAL

## 2024-02-29 DIAGNOSIS — E66.09 CLASS 1 OBESITY DUE TO EXCESS CALORIES WITHOUT SERIOUS COMORBIDITY WITH BODY MASS INDEX (BMI) OF 32.0 TO 32.9 IN ADULT: ICD-10-CM

## 2024-02-29 DIAGNOSIS — E03.9 HYPOTHYROIDISM, UNSPECIFIED TYPE: ICD-10-CM

## 2024-02-29 LAB
ALBUMIN SERPL BCP-MCNC: 4 G/DL (ref 3.5–5.2)
ALP SERPL-CCNC: 75 U/L (ref 55–135)
ALT SERPL W/O P-5'-P-CCNC: 16 U/L (ref 10–44)
ANION GAP SERPL CALC-SCNC: 7 MMOL/L (ref 8–16)
AST SERPL-CCNC: 19 U/L (ref 10–40)
BILIRUB SERPL-MCNC: 0.5 MG/DL (ref 0.1–1)
BUN SERPL-MCNC: 22 MG/DL (ref 6–20)
CALCIUM SERPL-MCNC: 9.7 MG/DL (ref 8.7–10.5)
CHLORIDE SERPL-SCNC: 109 MMOL/L (ref 95–110)
CO2 SERPL-SCNC: 26 MMOL/L (ref 23–29)
CREAT SERPL-MCNC: 1 MG/DL (ref 0.5–1.4)
EST. GFR  (NO RACE VARIABLE): >60 ML/MIN/1.73 M^2
GLUCOSE SERPL-MCNC: 90 MG/DL (ref 70–110)
POTASSIUM SERPL-SCNC: 4.7 MMOL/L (ref 3.5–5.1)
PROT SERPL-MCNC: 7.2 G/DL (ref 6–8.4)
SODIUM SERPL-SCNC: 142 MMOL/L (ref 136–145)
T4 FREE SERPL-MCNC: 1.3 NG/DL (ref 0.71–1.51)
TSH SERPL DL<=0.005 MIU/L-ACNC: 0.04 UIU/ML (ref 0.4–4)

## 2024-02-29 PROCEDURE — 84439 ASSAY OF FREE THYROXINE: CPT | Performed by: INTERNAL MEDICINE

## 2024-02-29 PROCEDURE — 84443 ASSAY THYROID STIM HORMONE: CPT | Performed by: INTERNAL MEDICINE

## 2024-02-29 PROCEDURE — 36415 COLL VENOUS BLD VENIPUNCTURE: CPT | Mod: PN | Performed by: INTERNAL MEDICINE

## 2024-02-29 PROCEDURE — 80053 COMPREHEN METABOLIC PANEL: CPT | Performed by: INTERNAL MEDICINE

## 2024-02-29 PROCEDURE — 83036 HEMOGLOBIN GLYCOSYLATED A1C: CPT | Performed by: INTERNAL MEDICINE

## 2024-03-01 LAB
ESTIMATED AVG GLUCOSE: 108 MG/DL (ref 68–131)
HBA1C MFR BLD: 5.4 % (ref 4–5.6)

## 2024-03-05 ENCOUNTER — TELEPHONE (OUTPATIENT)
Dept: FAMILY MEDICINE | Facility: CLINIC | Age: 59
End: 2024-03-05
Payer: COMMERCIAL

## 2024-03-05 DIAGNOSIS — E03.9 HYPOTHYROIDISM, UNSPECIFIED TYPE: Primary | ICD-10-CM

## 2024-03-05 RX ORDER — LEVOTHYROXINE SODIUM 112 UG/1
112 TABLET ORAL
Qty: 30 TABLET | Refills: 11 | Status: SHIPPED | OUTPATIENT
Start: 2024-03-05 | End: 2024-03-12 | Stop reason: SDUPTHER

## 2024-03-05 NOTE — TELEPHONE ENCOUNTER
----- Message from Michael Villanueva sent at 3/5/2024  9:10 AM CST -----  Regarding: Self [phone]  Type:  Sooner Appointment Request    Patient is requesting a sooner appointment.  Patient declined first available appointment listed as well as another facility and provider .  Patient will not accept being placed on the waitlist and is requesting a message be sent to doctor.    Name of Caller:  Self     When is the first available appointment?  July 2024     Symptoms:  follow up, needs to reschedule because the weather     Would the patient rather a call back or a response via [facility]?  Call back     Best Call Back Number:  [phone]     Additional Information:

## 2024-03-12 ENCOUNTER — TELEPHONE (OUTPATIENT)
Dept: FAMILY MEDICINE | Facility: CLINIC | Age: 59
End: 2024-03-12

## 2024-03-12 ENCOUNTER — OFFICE VISIT (OUTPATIENT)
Dept: FAMILY MEDICINE | Facility: CLINIC | Age: 59
End: 2024-03-12
Payer: COMMERCIAL

## 2024-03-12 VITALS
HEIGHT: 65 IN | OXYGEN SATURATION: 98 % | WEIGHT: 196.44 LBS | DIASTOLIC BLOOD PRESSURE: 72 MMHG | HEART RATE: 79 BPM | BODY MASS INDEX: 32.73 KG/M2 | SYSTOLIC BLOOD PRESSURE: 116 MMHG | TEMPERATURE: 99 F

## 2024-03-12 DIAGNOSIS — E03.9 HYPOTHYROIDISM, ACQUIRED: Primary | ICD-10-CM

## 2024-03-12 DIAGNOSIS — Z76.89 ENCOUNTER FOR WEIGHT MANAGEMENT: ICD-10-CM

## 2024-03-12 DIAGNOSIS — E66.09 CLASS 1 OBESITY DUE TO EXCESS CALORIES WITHOUT SERIOUS COMORBIDITY WITH BODY MASS INDEX (BMI) OF 32.0 TO 32.9 IN ADULT: ICD-10-CM

## 2024-03-12 DIAGNOSIS — E03.9 HYPOTHYROIDISM, UNSPECIFIED TYPE: ICD-10-CM

## 2024-03-12 PROCEDURE — 1160F RVW MEDS BY RX/DR IN RCRD: CPT | Mod: CPTII,S$GLB,, | Performed by: INTERNAL MEDICINE

## 2024-03-12 PROCEDURE — 3078F DIAST BP <80 MM HG: CPT | Mod: CPTII,S$GLB,, | Performed by: INTERNAL MEDICINE

## 2024-03-12 PROCEDURE — 99214 OFFICE O/P EST MOD 30 MIN: CPT | Mod: S$GLB,,, | Performed by: INTERNAL MEDICINE

## 2024-03-12 PROCEDURE — 99999 PR PBB SHADOW E&M-EST. PATIENT-LVL V: CPT | Mod: PBBFAC,,, | Performed by: INTERNAL MEDICINE

## 2024-03-12 PROCEDURE — 3074F SYST BP LT 130 MM HG: CPT | Mod: CPTII,S$GLB,, | Performed by: INTERNAL MEDICINE

## 2024-03-12 PROCEDURE — 3044F HG A1C LEVEL LT 7.0%: CPT | Mod: CPTII,S$GLB,, | Performed by: INTERNAL MEDICINE

## 2024-03-12 PROCEDURE — 1159F MED LIST DOCD IN RCRD: CPT | Mod: CPTII,S$GLB,, | Performed by: INTERNAL MEDICINE

## 2024-03-12 PROCEDURE — 3008F BODY MASS INDEX DOCD: CPT | Mod: CPTII,S$GLB,, | Performed by: INTERNAL MEDICINE

## 2024-03-12 RX ORDER — PHENTERMINE HYDROCHLORIDE 37.5 MG/1
37.5 TABLET ORAL
Qty: 30 TABLET | Refills: 2 | Status: SHIPPED | OUTPATIENT
Start: 2024-03-12 | End: 2024-04-18 | Stop reason: SDUPTHER

## 2024-03-12 RX ORDER — PHENTERMINE AND TOPIRAMATE 3.75; 23 MG/1; MG/1
CAPSULE, EXTENDED RELEASE ORAL
Status: CANCELLED | OUTPATIENT
Start: 2024-03-12 | End: 2024-03-22

## 2024-03-12 RX ORDER — LEVOTHYROXINE SODIUM 112 UG/1
112 TABLET ORAL
Qty: 30 TABLET | Refills: 11 | Status: SHIPPED | OUTPATIENT
Start: 2024-03-12 | End: 2024-04-18 | Stop reason: SDUPTHER

## 2024-03-12 NOTE — PATIENT INSTRUCTIONS
Please verify family history of thyroid cancer, and whether it was medullary thyroid cancer specifically or Multiple endocrine neoplasia.      Sample Weight Training Plan ( adapted from [facility]):    1.Goblet Squat  How to:    Stand with feet hip-width apart and hold a weight vertically in front of chest, elbows pointing toward the floor.  Push hips back and bend knees to lower into a squat.  Drive through heels to stand back up to starting position. That's 1 rep. Complete three to five reps with a heavy weight.      2.Bent-Over Row  How to:    With a dumbbell in each hand and feet under hips, hinge at hips with knees slightly bent and arms just in front of legs.  Drive one elbow back toward hips, feeling shoulder blades squeeze together, pulling weight toward side body.  Slowly lower weight back down, then repeat with other arm. That's 1 rep. Complete three to five reps with a medium-heavy weight.        3.Lateral Lunge  How to:    Holding a weight at chest or dumbbells at each side, stand up straight with feet hip-width apart.  Take a large step to the right, sit hips back, and lower down until right knee is nearly parallel with the floor. Your left leg should be straight.  Return to start. That's 1 rep. Complete 10 reps on each side.      4.Chest Press  How to:    Lie flat on back, or on a bench, with feet flat on the ground. With a dumbbell in each hand, extend arms directly over shoulders, palms facing toward feet.  Squeeze shoulder blades together and slowly bend elbows, lowering the weights out to the side, parallel with shoulders, until elbows form 90-degree angles.  Slowly drive the dumbbells back up to start, squeezing shoulder blades the entire time. Thats 1 rep. Complete three to five reps with a medium-heavy weight.      5.Split Stance Shoulder Press    How to:    Grab a pair of dumbbells or a resistance band. Stagger stance into a wide step, one foot forward and one back with hips  squared, and hold the weights or band just above shoulders, elbows close to sides.  Leaning forward ever so slightly, bend both knees, and press through front heel while simultaneously lifting the weights or band to the sara, keeping elbows forward and arms in line with your ears.  Lower weights or band back to shoulders. That's 1 rep. Do 10 reps, alternating side for each set.        6.Alternating Reverse Lunge To Bicep Curl  How to:    Grab a pair of dumbbells and hold them at arm's length next to sides, palms facing each other. Stand tall with feet hip-width apart.  Step backward with right leg and lower body until front knee is bent 90 degrees. At the same time as you lunge, curl both dumbbells up to shoulders.  Lower the dumbbells as you return to the starting position. Step back with the other leg and repeat.That's 1 rep. Complete 12 reps with a medium weight.

## 2024-03-12 NOTE — PROGRESS NOTES
"HISTORY OF PRESENT ILLNESS:  Mirta Perry is a 59 y.o. female who presents to the clinic today for Follow-up (3 months f/u)    Last seen by me 12/2023.    Obesity  She did take CONTRAVE for two weeks and did not tolerate.  She notes symptoms of extreme fatigue.  Did not have an effect on appetite.  Feels she may had mood swings on it.  Notes h/o intolerance of fatty foods with h/o gallbladder removal so is hesitant for use of orlistat.    Daughter and father with thyroid cancer - not sure what type.  Notes a remote h/o kidney stone twice - possible calcium stone?  Has tried ketogenic diet in the past - 2023 - lost 25 lbs.  Not consistently exercising and note currently following a particular eating plan.    Prior weight loss medication: Adipex - no side effects    Prior weight loss surgery:  Sleeve gastrectomy 2015    Wt Readings from Last 3 Encounters:   03/12/24 1444 89.1 kg (196 lb 6.9 oz)   12/05/23 1134 87.9 kg (193 lb 12.6 oz)   05/05/22 1101 84.3 kg (185 lb 13.6 oz)       Estimated body mass index is 32.25 kg/m² as calculated from the following:    Height as of 12/5/23: 5' 5" (1.651 m).    Weight as of 12/5/23: 87.9 kg (193 lb 12.6 oz).  Patient weight not recorded     Reports lowest weight was 155 lb after sleeve gastrectomy.     Goal weight: 166 lb    Hypothyroidism  Levothyroxine adjusted to 112 mcg daily - not started yet.    PAST MEDICAL HISTORY:  Past Medical History:   Diagnosis Date    Fatigue     Fibroids     uterine    Hernia, ventral     s/p cholectectomy    Hypothyroidism, acquired     Migraines        PAST SURGICAL HISTORY:  Past Surgical History:   Procedure Laterality Date    APPENDECTOMY Bilateral     tummy tuck    AUGMENTATION OF BREAST Bilateral     BREAST SURGERY Bilateral     augmentation    CARPAL TUNNEL RELEASE Right 05/2014    CHOLECYSTECTOMY      laparoscopic    COLONOSCOPY N/A 11/12/2019    Procedure: COLONOSCOPY;  Surgeon: Malick Rogers II, MD;  Location: [facility] ENDO;  " Service: Endoscopy;  Laterality: N/A;  confirmed appt-sp    HYSTERECTOMY      LAPAROSCOPIC SLEEVE GASTRECTOMY  2016    [facility]    meniscal surgery  05/2014    left knee    OOPHORECTOMY      1 ov removed    rotator cuff surgery Right     TUBAL LIGATION      bitateral       SOCIAL HISTORY:  Social History     Socioeconomic History    Marital status:      Spouse name: michael    Number of children: 4    Years of education: college   Occupational History    Occupation:      Employer: [employer]   Tobacco Use    Smoking status: Never    Smokeless tobacco: Never   Substance and Sexual Activity    Alcohol use: No    Drug use: No    Sexual activity: Yes     Partners: Male   Social History Narrative    Adult Screenings updated and reviewed  6/5/14    Mammogram( for females) overdue     Pap ( for females)overdue    Colonoscopy age  49- not done yet     Flu shot yearly not done for 2013     Td ?    Pneumovax recommended one time  at age  64    Zostavax recommended one time at  age  59    Eye exam 2 years Bone density - not done          U/s thyroid  4/24/14     Social Determinants of Health     Financial Resource Strain: Low Risk  (2/27/2024)    Overall Financial Resource Strain (CARDIA)     Difficulty of Paying Living Expenses: Not very hard   Food Insecurity: No Food Insecurity (2/27/2024)    Hunger Vital Sign     Worried About Running Out of Food in the Last Year: Never true     Ran Out of Food in the Last Year: Never true   Transportation Needs: No Transportation Needs (2/27/2024)    PRAPARE - Transportation     Lack of Transportation (Medical): No     Lack of Transportation (Non-Medical): No   Physical Activity: Insufficiently Active (2/27/2024)    Exercise Vital Sign     Days of Exercise per Week: 4 days     Minutes of Exercise per Session: 20 min   Stress: No Stress Concern Present (2/27/2024)    [employer] - Occupational Stress Questionnaire     Feeling of Stress : Not  at all   Social Connections: Unknown (2/27/2024)    Social Connection and Isolation Panel [NHANES]     Frequency of Communication with Friends and Family: More than three times a week     Frequency of Social Gatherings with Friends and Family: More than three times a week     Active Member of Clubs or Organizations: Yes     Attends Club or Organization Meetings: More than 4 times per year     Marital Status:    Housing Stability: Low Risk  (2/27/2024)    Housing Stability Vital Sign     Unable to Pay for Housing in the Last Year: No     Number of Places Lived in the Last Year: 1     Unstable Housing in the Last Year: No       FAMILY HISTORY:  Family History   Problem Relation Age of Onset    Diabetes Mellitus Mother     Thyroid cancer Father         daughter     Parkinsonism Father     Heart disease Father     Breast cancer Maternal Grandmother     Breast cancer Paternal Grandmother        ALLERGIES AND MEDICATIONS: updated and reviewed.  Review of patient's allergies indicates:   Allergen Reactions    Ciprofloxacin     Percocet  [oxycodone-acetaminophen]      Other reaction(s): Hives     Medication List with Changes/Refills   Current Medications    GABAPENTIN (NEURONTIN) 300 MG CAPSULE    Take 1 capsule (300 mg total) by mouth every evening.    LEVOTHYROXINE (SYNTHROID) 112 MCG TABLET    Take 1 tablet (112 mcg total) by mouth before breakfast.    NALTREXONE-BUPROPION (CONTRAVE) 8-90 MG TBSR    Take 1 tablet by mouth daily in AM for 1 week, THEN 1 tablet twice daily for 1 week, THEN 2 tablets in AM and 1 tablet in PM for 1 week, THEN 2 tablets twice daily (Max dose: 4 tablets/day).          CARE TEAM:  Patient Care Team:  Steve Quintanilla MD as PCP - General (Internal Medicine)         REVIEW OF SYSTEMS:  Review of Systems   Constitutional:  Negative for chills and fever.   HENT:  Negative for congestion and postnasal drip.    Eyes:  Negative for photophobia and visual disturbance.   Respiratory:  Negative  for cough and shortness of breath.    Cardiovascular:  Negative for chest pain and palpitations.   Gastrointestinal:  Negative for nausea and vomiting.   Genitourinary:  Negative for dysuria and frequency.   Musculoskeletal:  Negative for arthralgias and back pain.   Neurological:  Negative for light-headedness and headaches.   Psychiatric/Behavioral:  Negative for dysphoric mood.          PHYSICAL EXAM:   Vitals:    03/12/24 1444   BP: 116/72   Pulse: 79   Temp: 98.7 °F (37.1 °C)             Body mass index is 32.69 kg/m².     General appearance - alert, well appearing, and in no distress and obese  Mental status - normal mood, behavior, speech, dress, motor activity, and thought processes  Eyes - sclera anicteric, left eye normal, right eye normal  Chest - no tachypnea, retractions or cyanosis  Neurological - alert, oriented, normal speech, no focal findings or movement disorder noted  Musculoskeletal - no joint tenderness, deformity or swelling  Extremities - peripheral pulses normal, no pedal edema, no clubbing or cyanosis      ASSESSMENT AND PLAN:  Hypothyroidism, acquired  Hypothyroidism, unspecified type  -     levothyroxine (SYNTHROID) 112 MCG tablet; Take 1 tablet (112 mcg total) by mouth before breakfast.  Dispense: 30 tablet; Refill: 11  - Repeat labs 2 months.    Class 1 obesity due to excess calories without serious comorbidity with body mass index (BMI) of 32.0 to 32.9 in adult  Encounter for weight management  -     Ambulatory Referral/Consult to Lifestyle Nutrition; Future; Expected date: 03/19/2024  -     phentermine (ADIPEX-P) 37.5 mg tablet; Take 1 tablet (37.5 mg total) by mouth before breakfast.  Dispense: 30 tablet; Refill: 2  - Noted intolerance of Contrave.  - Noted H/o kidney stones x 2 and possible family h/o unknown thyroid cancer.  At this time will hold off on topiramate containing medication and GLP1 agonist until further clarification.  - Patient warned of common side effects of  phentermine including anxiety, insomnia, palpitations and increased blood pressure. It was also explained that it is for short-term usage along with diet and exercise, and that stopping the medication without making lifestyle changes will result in regain of weight. Patient states understanding. Advised that it cannot be used for greater than 3 months at a time.  - Weight loss medications are controlled substances.  They require routine follow up. Prescription or pills that are lost or destroyed will not be replaced.   - Advised for low carbohydrate Mediterranean eating plan with elimination of added sugar and processed foods.  Printed education material provided.        Follow up 3 months or sooner as needed.

## 2024-03-12 NOTE — TELEPHONE ENCOUNTER
----- Message from Steve Quintanilla MD sent at 3/5/2024  4:56 PM CST -----  Please call to inform that thyroid test is abnormal requiring reduced of dose of levothyroxine.  Prescription for levothyroxine 112 mcg daily sent to pharmacy.  Schedule non fasting thyroid lab 8 weeks.    Other results are fine.

## 2024-03-13 ENCOUNTER — PATIENT MESSAGE (OUTPATIENT)
Dept: FAMILY MEDICINE | Facility: CLINIC | Age: 59
End: 2024-03-13
Payer: COMMERCIAL

## 2024-04-18 DIAGNOSIS — E03.9 HYPOTHYROIDISM, UNSPECIFIED TYPE: ICD-10-CM

## 2024-04-18 DIAGNOSIS — Z76.89 ENCOUNTER FOR WEIGHT MANAGEMENT: ICD-10-CM

## 2024-04-18 DIAGNOSIS — E66.09 CLASS 1 OBESITY DUE TO EXCESS CALORIES WITHOUT SERIOUS COMORBIDITY WITH BODY MASS INDEX (BMI) OF 32.0 TO 32.9 IN ADULT: ICD-10-CM

## 2024-04-18 DIAGNOSIS — R23.2 HOT FLASHES: ICD-10-CM

## 2024-04-18 NOTE — TELEPHONE ENCOUNTER
No care due was identified.  [facility] Embedded Care Due Messages. Reference number: [id number].   4/18/2024 6:36:03 AM CDT

## 2024-04-19 RX ORDER — LEVOTHYROXINE SODIUM 112 UG/1
112 TABLET ORAL
Qty: 30 TABLET | Refills: 5 | Status: SHIPPED | OUTPATIENT
Start: 2024-04-19 | End: 2025-04-19

## 2024-04-19 RX ORDER — PHENTERMINE HYDROCHLORIDE 37.5 MG/1
37.5 TABLET ORAL
Qty: 30 TABLET | Refills: 0 | Status: SHIPPED | OUTPATIENT
Start: 2024-04-19 | End: 2024-07-18

## 2024-04-19 RX ORDER — GABAPENTIN 300 MG/1
300 CAPSULE ORAL NIGHTLY
Qty: 30 CAPSULE | Refills: 2 | Status: SHIPPED | OUTPATIENT
Start: 2024-04-19 | End: 2024-07-18

## 2024-05-01 ENCOUNTER — LAB VISIT (OUTPATIENT)
Dept: LAB | Facility: HOSPITAL | Age: 59
End: 2024-05-01
Attending: INTERNAL MEDICINE
Payer: COMMERCIAL

## 2024-05-01 DIAGNOSIS — E03.9 HYPOTHYROIDISM, UNSPECIFIED TYPE: ICD-10-CM

## 2024-05-01 LAB
T4 FREE SERPL-MCNC: 0.82 NG/DL (ref 0.71–1.51)
TSH SERPL DL<=0.005 MIU/L-ACNC: 1.37 UIU/ML (ref 0.4–4)

## 2024-05-01 PROCEDURE — 36415 COLL VENOUS BLD VENIPUNCTURE: CPT | Mod: PN | Performed by: INTERNAL MEDICINE

## 2024-05-01 PROCEDURE — 84439 ASSAY OF FREE THYROXINE: CPT | Performed by: INTERNAL MEDICINE

## 2024-05-01 PROCEDURE — 84443 ASSAY THYROID STIM HORMONE: CPT | Performed by: INTERNAL MEDICINE

## 2024-05-21 ENCOUNTER — PATIENT MESSAGE (OUTPATIENT)
Dept: ADMINISTRATIVE | Facility: HOSPITAL | Age: 59
End: 2024-05-21
Payer: COMMERCIAL

## 2024-07-17 ENCOUNTER — OFFICE VISIT (OUTPATIENT)
Dept: FAMILY MEDICINE | Facility: CLINIC | Age: 59
End: 2024-07-17
Payer: COMMERCIAL

## 2024-07-17 VITALS
BODY MASS INDEX: 30.93 KG/M2 | TEMPERATURE: 98 F | OXYGEN SATURATION: 96 % | HEIGHT: 65 IN | HEART RATE: 60 BPM | SYSTOLIC BLOOD PRESSURE: 110 MMHG | DIASTOLIC BLOOD PRESSURE: 80 MMHG | WEIGHT: 185.63 LBS

## 2024-07-17 DIAGNOSIS — N95.1 MENOPAUSAL SYMPTOM: ICD-10-CM

## 2024-07-17 DIAGNOSIS — E66.09 CLASS 1 OBESITY DUE TO EXCESS CALORIES WITHOUT SERIOUS COMORBIDITY WITH BODY MASS INDEX (BMI) OF 32.0 TO 32.9 IN ADULT: Primary | ICD-10-CM

## 2024-07-17 DIAGNOSIS — Z76.89 ENCOUNTER FOR WEIGHT MANAGEMENT: ICD-10-CM

## 2024-07-17 DIAGNOSIS — E04.1 THYROID NODULE: ICD-10-CM

## 2024-07-17 PROCEDURE — 99214 OFFICE O/P EST MOD 30 MIN: CPT | Mod: S$GLB,,, | Performed by: INTERNAL MEDICINE

## 2024-07-17 PROCEDURE — 3079F DIAST BP 80-89 MM HG: CPT | Mod: CPTII,S$GLB,, | Performed by: INTERNAL MEDICINE

## 2024-07-17 PROCEDURE — 3044F HG A1C LEVEL LT 7.0%: CPT | Mod: CPTII,S$GLB,, | Performed by: INTERNAL MEDICINE

## 2024-07-17 PROCEDURE — 1160F RVW MEDS BY RX/DR IN RCRD: CPT | Mod: CPTII,S$GLB,, | Performed by: INTERNAL MEDICINE

## 2024-07-17 PROCEDURE — 1159F MED LIST DOCD IN RCRD: CPT | Mod: CPTII,S$GLB,, | Performed by: INTERNAL MEDICINE

## 2024-07-17 PROCEDURE — 99999 PR PBB SHADOW E&M-EST. PATIENT-LVL IV: CPT | Mod: PBBFAC,,, | Performed by: INTERNAL MEDICINE

## 2024-07-17 PROCEDURE — 3008F BODY MASS INDEX DOCD: CPT | Mod: CPTII,S$GLB,, | Performed by: INTERNAL MEDICINE

## 2024-07-17 PROCEDURE — 3074F SYST BP LT 130 MM HG: CPT | Mod: CPTII,S$GLB,, | Performed by: INTERNAL MEDICINE

## 2024-07-17 RX ORDER — SEMAGLUTIDE 0.25 MG/.5ML
0.25 INJECTION, SOLUTION SUBCUTANEOUS
Qty: 2 ML | Refills: 0 | Status: CANCELLED | OUTPATIENT
Start: 2024-07-17

## 2024-07-17 RX ORDER — TIRZEPATIDE 2.5 MG/.5ML
2.5 INJECTION, SOLUTION SUBCUTANEOUS
Qty: 4 PEN | Refills: 0 | Status: SHIPPED | OUTPATIENT
Start: 2024-07-17

## 2024-07-17 RX ORDER — SEMAGLUTIDE 1 MG/.5ML
1 INJECTION, SOLUTION SUBCUTANEOUS
Qty: 2 ML | Refills: 2 | Status: CANCELLED | OUTPATIENT
Start: 2024-09-02

## 2024-07-17 RX ORDER — SEMAGLUTIDE 0.5 MG/.5ML
0.5 INJECTION, SOLUTION SUBCUTANEOUS
Qty: 3 ML | Refills: 0 | Status: CANCELLED | OUTPATIENT
Start: 2024-08-05

## 2024-07-17 RX ORDER — TIRZEPATIDE 5 MG/.5ML
5 INJECTION, SOLUTION SUBCUTANEOUS
Qty: 2 ML | Refills: 2 | Status: SHIPPED | OUTPATIENT
Start: 2024-08-05

## 2024-07-17 NOTE — PROGRESS NOTES
"HISTORY OF PRESENT ILLNESS:  Mirta Perry is a 59 y.o. female who presents to the clinic today for Follow-up    Last seen by me 3/2024    Weight management  Prescribed phentermine at last visit - finished in June and felt like it was helpful.  Did not tolerate Contrave due to side effect of extreme fatigue and lack of effect of reducing appetite.  She has h/o sleeve gastrectomy 2015.  Following Mediterranean/keto style of eating but digressed recently at [place] wedding, trip with family recently.    Wt Readings from Last 3 Encounters:   07/17/24 1535 84.2 kg (185 lb 10 oz)   03/12/24 1444 89.1 kg (196 lb 6.9 oz)   12/05/23 1134 87.9 kg (193 lb 12.6 oz)        Estimated body mass index is 32.69 kg/m² as calculated from the following:    Height as of 3/12/24: 5' 5" (1.651 m).    Weight as of 3/12/24: 89.1 kg (196 lb 6.9 oz).  Patient weight not recorded     Taking gabapentin as needed at night time.    PAST MEDICAL HISTORY:  Past Medical History:   Diagnosis Date    Fatigue     Fibroids     uterine    Hernia, ventral     s/p cholectectomy    Hypothyroidism, acquired     Migraines        PAST SURGICAL HISTORY:  Past Surgical History:   Procedure Laterality Date    APPENDECTOMY Bilateral     tummy tuck    AUGMENTATION OF BREAST Bilateral     BREAST SURGERY Bilateral     augmentation    CARPAL TUNNEL RELEASE Right 05/2014    CHOLECYSTECTOMY      laparoscopic    COLONOSCOPY N/A 11/12/2019    Procedure: COLONOSCOPY;  Surgeon: Malick Rogers II, MD;  Location: [facility];  Service: Endoscopy;  Laterality: N/A;  confirmed appt-sp    COSMETIC SURGERY      HERNIA REPAIR      HYSTERECTOMY      LAPAROSCOPIC SLEEVE GASTRECTOMY  2016    [facility]    meniscal surgery  05/2014    left knee    OOPHORECTOMY      1 ov removed    rotator cuff surgery Right     TUBAL LIGATION      bitateral       SOCIAL HISTORY:  Social History     Socioeconomic History    Marital status:      Spouse name: michael    Number of children: 4    " Years of education: college   Occupational History    Occupation:      Employer: [employer]   Tobacco Use    Smoking status: Never    Smokeless tobacco: Never   Substance and Sexual Activity    Alcohol use: Never    Drug use: Never    Sexual activity: Yes     Partners: Male     Birth control/protection: See Surgical Hx, None   Social History Narrative    Adult Screenings updated and reviewed  6/5/14    Mammogram( for females) overdue     Pap ( for females)overdue    Colonoscopy age  49- not done yet     Flu shot yearly not done for 2013     Td ?    Pneumovax recommended one time  at age  64    Zostavax recommended one time at  age  59    Eye exam 2 years Bone density - not done          U/s thyroid  4/24/14     Social Determinants of Health     Financial Resource Strain: Low Risk  (2/27/2024)    Overall Financial Resource Strain (CARDIA)     Difficulty of Paying Living Expenses: Not very hard   Food Insecurity: No Food Insecurity (2/27/2024)    Hunger Vital Sign     Worried About Running Out of Food in the Last Year: Never true     Ran Out of Food in the Last Year: Never true   Transportation Needs: No Transportation Needs (2/27/2024)    PRAPARE - Transportation     Lack of Transportation (Medical): No     Lack of Transportation (Non-Medical): No   Physical Activity: Insufficiently Active (2/27/2024)    Exercise Vital Sign     Days of Exercise per Week: 4 days     Minutes of Exercise per Session: 20 min   Stress: No Stress Concern Present (2/27/2024)    [employer] [place] [employer] - Occupational Stress Questionnaire     Feeling of Stress : Not at all   Housing Stability: Low Risk  (2/27/2024)    Housing Stability Vital Sign     Unable to Pay for Housing in the Last Year: No     Number of Places Lived in the Last Year: 1     Unstable Housing in the Last Year: No       FAMILY HISTORY:  Family History   Problem Relation Name Age of Onset    Diabetes Mellitus Mother Desiree Mcgarry     Arthritis  Mother Desiree Mcgarry     Thyroid cancer Father Rohan Mcgarry         daughter     Parkinsonism Father Rohan Mcgarry     Heart disease Father Rohan Mcgarry     Cancer Father Rohan Mcgarry     Early death Father Rohan Mcgarry     Breast cancer Maternal Grandmother Roxanna Cotto     Arthritis Maternal Grandmother Roxanna Cotto     Cancer Maternal Grandmother Roxanna Cotto     Breast cancer Paternal Grandmother Cielo Mcgarry     Cancer Paternal Grandmother Cielo Mcgarry     Birth defects Daughter Granddaughter Meenakshi Perry     Cancer Daughter Naomie Perry Camardelle     Early death Daughter Granddaughter- Meenakshi Perry     Mental illness Son Dayron Perry     Miscarriages / Stillbirths Son Alfonso Perry        ALLERGIES AND MEDICATIONS: updated and reviewed.  Review of patient's allergies indicates:   Allergen Reactions    Ciprofloxacin     Percocet  [oxycodone-acetaminophen]      Other reaction(s): Hives     Medication List with Changes/Refills   Current Medications    GABAPENTIN (NEURONTIN) 300 MG CAPSULE    Take 1 capsule (300 mg total) by mouth every evening.    LEVOTHYROXINE (SYNTHROID) 112 MCG TABLET    Take 1 tablet (112 mcg total) by mouth before breakfast.    PHENTERMINE (ADIPEX-P) 37.5 MG TABLET    Take 1 tablet (37.5 mg total) by mouth before breakfast.          CARE TEAM:  Patient Care Team:  Steve Quintanilla MD as PCP - General (Internal Medicine)         REVIEW OF SYSTEMS:  Review of Systems   Constitutional:  Negative for chills and fever.   HENT:  Negative for congestion and postnasal drip.    Eyes:  Negative for photophobia and visual disturbance.   Respiratory:  Negative for cough and shortness of breath.    Cardiovascular:  Negative for chest pain and palpitations.   Gastrointestinal:  Negative for nausea and vomiting.   Genitourinary:  Negative for dysuria and frequency.   Musculoskeletal:  Negative for arthralgias and back pain.   Neurological:   Negative for light-headedness and headaches.   Psychiatric/Behavioral:  Negative for dysphoric mood. The patient is not nervous/anxious.          PHYSICAL EXAM:   Vitals:    07/17/24 1535   BP: 110/80   Pulse: 60   Temp: 98.2 °F (36.8 °C)             Body mass index is 30.89 kg/m².     General appearance - alert, well appearing, and in no distress  Mental status - alert, oriented to person, place, and time  Eyes - pupils equal and reactive, extraocular eye movements intact  Neck - supple, no significant adenopathy  Chest - clear to auscultation, no wheezes, rales or rhonchi, symmetric air entry  Heart - normal rate, regular rhythm, normal S1, S2, no murmurs, rubs, clicks or gallops  Musculoskeletal - no joint tenderness, deformity or swelling  Extremities - peripheral pulses normal, no pedal edema, no clubbing or cyanosis      ASSESSMENT AND PLAN:  Class 1 obesity due to excess calories without serious comorbidity with body mass index (BMI) of 32.0 to 32.9 in adult  Encounter for weight management  -     tirzepatide, weight loss, (ZEPBOUND) 2.5 mg/0.5 mL PnIj; Inject 2.5 mg into the skin every 7 days.  Dispense: 4 Pen; Refill: 0  -     tirzepatide, weight loss, (ZEPBOUND) 5 mg/0.5 mL PnIj; Inject 5 mg into the skin every 7 days.  Dispense: 2 mL; Refill: 2  - Will try for Zepbound coverage.  If not an option, then in sequence will try for Wegovy, Saxenda, followed by Qsymia depending on what can be covered.  5.6% TBWL on phentermine since 3/2024.    Menopausal symptom  -     Ambulatory referral/consult to Obstetrics / Gynecology; Future; Expected date: 07/24/2024    Thyroid nodule  -     US Thyroid; Future; Expected date: 07/17/2024         Follow up 4 months or sooner as needed.

## 2024-07-17 NOTE — PATIENT INSTRUCTIONS
Call [phone] to schedule referral with gynecology.      Start Zepbound 2.5 mg once a week  x 4 weeks, then 5 mg weekly. Rx sent in predated  Decrease portions as soon as you start Zepbound. Avoid fried, rich or greasy foods.   Some nausea in the first 2 weeks is not unusual.   If you get pain across the upper abdomen and around to your back, please call the office.   [email or web address] to sign up for coupon card.

## 2024-07-30 ENCOUNTER — HOSPITAL ENCOUNTER (OUTPATIENT)
Dept: RADIOLOGY | Facility: HOSPITAL | Age: 59
Discharge: HOME | End: 2024-07-30
Attending: INTERNAL MEDICINE
Payer: COMMERCIAL

## 2024-07-30 DIAGNOSIS — Z12.31 ENCOUNTER FOR SCREENING MAMMOGRAM FOR BREAST CANCER: ICD-10-CM

## 2024-07-30 DIAGNOSIS — E04.1 THYROID NODULE: ICD-10-CM

## 2024-07-30 PROCEDURE — 76536 US EXAM OF HEAD AND NECK: CPT | Mod: TC

## 2024-07-30 PROCEDURE — 77067 SCR MAMMO BI INCL CAD: CPT | Mod: TC

## 2024-07-30 PROCEDURE — 77063 BREAST TOMOSYNTHESIS BI: CPT | Mod: 26,,, | Performed by: RADIOLOGY

## 2024-07-30 PROCEDURE — 76536 US EXAM OF HEAD AND NECK: CPT | Mod: 26,,, | Performed by: RADIOLOGY

## 2024-07-30 PROCEDURE — 77063 BREAST TOMOSYNTHESIS BI: CPT | Mod: TC

## 2024-07-30 PROCEDURE — 77067 SCR MAMMO BI INCL CAD: CPT | Mod: 26,,, | Performed by: RADIOLOGY

## 2024-07-31 ENCOUNTER — PATIENT MESSAGE (OUTPATIENT)
Dept: FAMILY MEDICINE | Facility: CLINIC | Age: 59
End: 2024-07-31
Payer: COMMERCIAL

## 2024-08-12 DIAGNOSIS — E66.09 CLASS 1 OBESITY DUE TO EXCESS CALORIES WITHOUT SERIOUS COMORBIDITY WITH BODY MASS INDEX (BMI) OF 32.0 TO 32.9 IN ADULT: ICD-10-CM

## 2024-08-12 DIAGNOSIS — R23.2 HOT FLASHES: ICD-10-CM

## 2024-08-12 DIAGNOSIS — Z76.89 ENCOUNTER FOR WEIGHT MANAGEMENT: ICD-10-CM

## 2024-08-12 DIAGNOSIS — E03.9 HYPOTHYROIDISM, UNSPECIFIED TYPE: ICD-10-CM

## 2024-08-12 RX ORDER — LEVOTHYROXINE SODIUM 112 UG/1
112 TABLET ORAL
Qty: 90 TABLET | Refills: 3 | Status: SHIPPED | OUTPATIENT
Start: 2024-08-12 | End: 2025-08-12

## 2024-08-12 RX ORDER — LEVOTHYROXINE SODIUM 112 UG/1
112 TABLET ORAL
Qty: 90 TABLET | Refills: 3 | Status: SHIPPED | OUTPATIENT
Start: 2024-08-12 | End: 2024-08-12 | Stop reason: SDUPTHER

## 2024-08-12 NOTE — TELEPHONE ENCOUNTER
Refill Routing Note   Medication(s) are not appropriate for processing by [facility] for the following reason(s):        Outside of protocol    [facility] action(s):  Approve  Route             Appointments  past 12m or future 3m with PCP    Date Provider   Last Visit   7/17/2024 Steve Quintanilla MD   Next Visit   11/20/2024 Steve Quintanilla MD   ED visits in past 90 days: 0        Note composed:5:52 PM 08/12/2024

## 2024-08-12 NOTE — TELEPHONE ENCOUNTER
No care due was identified.  [facility] Embedded Care Due Messages. Reference number: [id number].   8/12/2024 7:52:45 AM CDT

## 2024-08-12 NOTE — TELEPHONE ENCOUNTER
Care Due:                  Date            Visit Type   Department     Provider  --------------------------------------------------------------------------------                                [facility]                              PRIMARY      MEDICINE/  Last Visit: 07-      CARE ([facility])   INTERNAL MED   Steve Quintanilla                              [facility]                              PRIMARY      MEDICINE/  Next Visit: 11-      CARE ([facility])   INTERNAL MED   Steve Quintanilla                                                            Last  Test          Frequency    Reason                     Performed    Due Date  --------------------------------------------------------------------------------    HBA1C.......  6 months...  tirzepatide,.............  03- 08-    [facility] Embedded Care Due Messages. Reference number: [id number].   8/12/2024 7:50:39 AM CDT

## 2024-08-13 RX ORDER — TIRZEPATIDE 5 MG/.5ML
5 INJECTION, SOLUTION SUBCUTANEOUS
Qty: 2 ML | Refills: 2 | Status: SHIPPED | OUTPATIENT
Start: 2024-08-13

## 2024-08-13 RX ORDER — GABAPENTIN 300 MG/1
300 CAPSULE ORAL NIGHTLY
Qty: 30 CAPSULE | Refills: 2 | Status: SHIPPED | OUTPATIENT
Start: 2024-08-13 | End: 2024-11-11

## 2024-09-09 DIAGNOSIS — R23.2 HOT FLASHES: ICD-10-CM

## 2024-09-09 NOTE — TELEPHONE ENCOUNTER
No care due was identified.  [facility] Embedded Care Due Messages. Reference number: [id number].   9/09/2024 12:55:24 PM CDT

## 2024-09-10 RX ORDER — GABAPENTIN 300 MG/1
300 CAPSULE ORAL NIGHTLY
Qty: 30 CAPSULE | Refills: 2 | Status: SHIPPED | OUTPATIENT
Start: 2024-09-10 | End: 2024-12-09

## 2024-09-10 NOTE — TELEPHONE ENCOUNTER
Refill Routing Note   Medication(s) are not appropriate for processing by [facility] for the following reason(s):      Medication outside of protocol    [facility] action(s):  Route Care Due:  None identified            Appointments  past 12m or future 3m with PCP    Date Provider   Last Visit   Visit date not found Rita Chawla MD   Next Visit   Visit date not found Rita Chawla MD   ED visits in past 90 days: 0        Note composed:11:56 AM 09/10/2024

## 2024-11-13 DIAGNOSIS — E66.09 CLASS 1 OBESITY DUE TO EXCESS CALORIES WITHOUT SERIOUS COMORBIDITY WITH BODY MASS INDEX (BMI) OF 32.0 TO 32.9 IN ADULT: ICD-10-CM

## 2024-11-13 DIAGNOSIS — E66.811 CLASS 1 OBESITY DUE TO EXCESS CALORIES WITHOUT SERIOUS COMORBIDITY WITH BODY MASS INDEX (BMI) OF 32.0 TO 32.9 IN ADULT: ICD-10-CM

## 2024-11-13 DIAGNOSIS — Z76.89 ENCOUNTER FOR WEIGHT MANAGEMENT: ICD-10-CM

## 2024-11-13 RX ORDER — TIRZEPATIDE 5 MG/.5ML
5 INJECTION, SOLUTION SUBCUTANEOUS
Qty: 2 ML | Refills: 2 | Status: SHIPPED | OUTPATIENT
Start: 2024-11-13

## 2024-11-13 NOTE — TELEPHONE ENCOUNTER
Care Due:                  Date            Visit Type   Department     Provider  --------------------------------------------------------------------------------                                [facility] FAMILY                              PRIMARY      MEDICINE/  Last Visit: 07-      CARE ([facility])   INTERNAL MED   Steve Quintanilla                              [facility]                              PRIMARY      MEDICINE/  Next Visit: 11-      CARE ([facility])   INTERNAL MED   Steve Quintanilla                                                            Last  Test          Frequency    Reason                     Performed    Due Date  --------------------------------------------------------------------------------    HBA1C.......  6 months...  tirzepatide,.............  03- 08-    [facility] Embedded Care Due Messages. Reference number: [id number].   11/13/2024 12:17:29 PM CST

## 2024-11-20 ENCOUNTER — OFFICE VISIT (OUTPATIENT)
Dept: FAMILY MEDICINE | Facility: CLINIC | Age: 59
End: 2024-11-20
Payer: COMMERCIAL

## 2024-11-20 VITALS
SYSTOLIC BLOOD PRESSURE: 114 MMHG | DIASTOLIC BLOOD PRESSURE: 76 MMHG | WEIGHT: 165.13 LBS | HEART RATE: 74 BPM | TEMPERATURE: 98 F | OXYGEN SATURATION: 91 % | HEIGHT: 65 IN | BODY MASS INDEX: 27.51 KG/M2

## 2024-11-20 DIAGNOSIS — Z00.00 HEALTHCARE MAINTENANCE: ICD-10-CM

## 2024-11-20 DIAGNOSIS — E66.811 CLASS 1 OBESITY DUE TO EXCESS CALORIES WITHOUT SERIOUS COMORBIDITY WITH BODY MASS INDEX (BMI) OF 32.0 TO 32.9 IN ADULT: ICD-10-CM

## 2024-11-20 DIAGNOSIS — E66.09 CLASS 1 OBESITY DUE TO EXCESS CALORIES WITHOUT SERIOUS COMORBIDITY WITH BODY MASS INDEX (BMI) OF 32.0 TO 32.9 IN ADULT: ICD-10-CM

## 2024-11-20 DIAGNOSIS — Z12.11 COLON CANCER SCREENING: Primary | ICD-10-CM

## 2024-11-20 PROCEDURE — 99999 PR PBB SHADOW E&M-EST. PATIENT-LVL IV: CPT | Mod: PBBFAC,,, | Performed by: INTERNAL MEDICINE

## 2024-11-20 NOTE — PROGRESS NOTES
HISTORY OF PRESENT ILLNESS:  Mirta Perry is a 59 y.o. female who presents to the clinic today for Follow-up  .     History of Present Illness    Mirta presents today for weight management follow-up.    Last seen by me 7/2024.    She reports significant weight loss progress. Current weight is 165 lbs 2 ounces, down from 185 lbs 10 ounces in July and 196 lbs 6.9 ounces in March. She is using tirzepatide 5 mg weekly for weight management, which she reports is working perfectly. She expresses desire to lose approximately 10 more lbs to reach her goal weight.    She follows a modified keto diet. Typical daily meals include coffee and an Atkins protein bar for breakfast, a mid-morning snack of one egg on low-carb bread, salad with added protein for lunch, and a small portion of protein with vegetables for dinner before 6 PM. She incorporates fruits, vegetables, whole grains, beans, and lentils into her diet. She eats fresh fish frequently due to living near water. She denies strict adherence to a no-carb diet, finding it unsustainable. She reports significant reduction in sugar cravings since starting tirzepatide, noting improved appetite control. She occasionally supplements with Premier Protein shakes for additional protein intake.    She continues tirzepatide 5 mg, reporting it works well for weight loss with no current side effects, though she experienced mild, manageable nausea when first starting. She continues Levothyroxine. She uses Gabapentin occasionally at night as needed, noting it helps achieve good sleep but causes significant drowsiness.    She reports going to bed around 10:00 PM and waking up at 4:30 AM without an alarm clock, aiming for 7-8 hours of sleep per night. She occasionally uses Gabapentin for sleep, primarily when she feels she needs a good night's rest. Gabapentin was initially prescribed for hot flashes, which she still experiences.    She plans to resume walking for exercise, which she  "had stopped due to weather conditions and caring for a young child. With improving weather and her grandchild now being 9 months old, she intends to start taking stroller walks.    She is aware of upcoming colon cancer screening due to previous polyps found 5 years ago, expressing a desire to delay the procedure until January if possible. She is also informed of scheduled fasting lab work for a full panel in the first week of December.    Prescribed tirzepatide for weight management.    Did not tolerate Contrave due to side effect of extreme fatigue and lack of effect of reducing appetite.  She has h/o sleeve gastrectomy 2015.    Wt Readings from Last 3 Encounters:   11/20/24 1456 74.9 kg (165 lb 2 oz)   07/17/24 1535 84.2 kg (185 lb 10 oz)   03/12/24 1444 89.1 kg (196 lb 6.9 oz)        Estimated body mass index is 30.89 kg/m² as calculated from the following:    Height as of 7/17/24: 5' 5" (1.651 m).    Weight as of 7/17/24: 84.2 kg (185 lb 10 oz).      ROS:  General: -fever, -chills, -fatigue, -weight gain, +weight loss, -loss of appetite  Eyes: -vision changes, -redness, -discharge  ENT: -ear pain, -nasal congestion, -sore throat  Cardiovascular: -chest pain, -palpitations, -lower extremity edema  Respiratory: -cough, -shortness of breath  Gastrointestinal: -abdominal pain, -nausea, -vomiting, -diarrhea, -constipation, -blood in stool  Genitourinary: -dysuria, -hematuria, -frequency  Musculoskeletal: -joint pain, -muscle pain  Skin: -rash, -lesion  Neurological: -headache, -dizziness, -numbness, -tingling  Psychiatric: -anxiety, -depression, -sleep difficulty             PAST MEDICAL HISTORY:  Past Medical History:   Diagnosis Date    Fatigue     Fibroids     uterine    Hernia, ventral     s/p cholectectomy    Hypothyroidism, acquired     Migraines        PAST SURGICAL HISTORY:  Past Surgical History:   Procedure Laterality Date    APPENDECTOMY Bilateral     tummy tuck    AUGMENTATION OF BREAST Bilateral     " BREAST SURGERY Bilateral     augmentation    CARPAL TUNNEL RELEASE Right 05/2014    CHOLECYSTECTOMY      laparoscopic    COLONOSCOPY N/A 11/12/2019    Procedure: COLONOSCOPY;  Surgeon: Malick Rogers II, MD;  Location: [facility];  Service: Endoscopy;  Laterality: N/A;  confirmed appt-sp    COSMETIC SURGERY      HERNIA REPAIR      HYSTERECTOMY      LAPAROSCOPIC SLEEVE GASTRECTOMY  2016    [facility]    meniscal surgery  05/2014    left knee    OOPHORECTOMY      1 ov removed    rotator cuff surgery Right     TUBAL LIGATION      bitateral       SOCIAL HISTORY:  Social History     Socioeconomic History    Marital status:      Spouse name: michael    Number of children: 4    Years of education: college   Occupational History    Occupation:      Employer: [employer]   Tobacco Use    Smoking status: Never    Smokeless tobacco: Never   Substance and Sexual Activity    Alcohol use: Never    Drug use: Never    Sexual activity: Yes     Partners: Male     Birth control/protection: See Surgical Hx, None   Social History Narrative    Adult Screenings updated and reviewed  6/5/14    Mammogram( for females) overdue     Pap ( for females)overdue    Colonoscopy age  49- not done yet     Flu shot yearly not done for 2013     Td ?    Pneumovax recommended one time  at age  64    Zostavax recommended one time at  age  59    Eye exam 2 years Bone density - not done          U/s thyroid  4/24/14     Social Drivers of Health     Financial Resource Strain: Low Risk  (2/27/2024)    Overall Financial Resource Strain (CARDIA)     Difficulty of Paying Living Expenses: Not very hard   Food Insecurity: No Food Insecurity (2/27/2024)    Hunger Vital Sign     Worried About Running Out of Food in the Last Year: Never true     Ran Out of Food in the Last Year: Never true   Transportation Needs: No Transportation Needs (2/27/2024)    PRAPARE - Transportation     Lack of Transportation (Medical): No     Lack of Transportation  (Non-Medical): No   Physical Activity: Insufficiently Active (2/27/2024)    Exercise Vital Sign     Days of Exercise per Week: 4 days     Minutes of Exercise per Session: 20 min   Stress: No Stress Concern Present (2/27/2024)    [employer] - Occupational Stress Questionnaire     Feeling of Stress : Not at all   Housing Stability: Low Risk  (2/27/2024)    Housing Stability Vital Sign     Unable to Pay for Housing in the Last Year: No     Number of Places Lived in the Last Year: 1     Unstable Housing in the Last Year: No       FAMILY HISTORY:  Family History   Problem Relation Name Age of Onset    Diabetes Mellitus Mother Desiree Mcgarry     Arthritis Mother Desiree Mcgarry     Thyroid cancer Father Rohan Mcgarry         daughter     Parkinsonism Father Rohan Mcgarry     Heart disease Father Rohan Mcgarry     Cancer Father Rohan Mcgarry     Early death Father Rohan Mcgarry     Breast cancer Maternal Grandmother Roxanna Cotto     Arthritis Maternal Grandmother Roxanna Cotto     Cancer Maternal Grandmother Roxanna Cotto     Breast cancer Paternal Grandmother Cielo Mcgarry     Cancer Paternal Grandmother Cielo Mcgarry     Birth defects Daughter Granddaughter Meenakshipancho Ratliffacker     Cancer Daughter Naomie Perry Camardelle     Early death Daughter Granddaughter- Meenakshi Perry     Mental illness Son Dayron Grahamjeffreyblake     Miscarriages / Stillbirths Son Alfonso Umaña Vicky        ALLERGIES AND MEDICATIONS: updated and reviewed.  Review of patient's allergies indicates:   Allergen Reactions    Ciprofloxacin     Percocet  [oxycodone-acetaminophen]      Other reaction(s): Hives     Medication List with Changes/Refills   Current Medications    GABAPENTIN (NEURONTIN) 300 MG CAPSULE    Take 1 capsule (300 mg total) by mouth every evening.    LEVOTHYROXINE (SYNTHROID) 112 MCG TABLET    Take 1 tablet (112 mcg total) by mouth before breakfast.    TIRZEPATIDE, WEIGHT LOSS,  "(ZEPBOUND) 5 MG/0.5 ML PNIJ    Inject 5 mg into the skin every 7 days.          CARE TEAM:  Patient Care Team:  Steve Quintanilla MD as PCP - General (Internal Medicine)         PHYSICAL EXAM:   Vitals:    11/20/24 1456   BP: 114/76   Pulse: 74   Temp: 98.1 °F (36.7 °C)     Weight: 74.9 kg (165 lb 2 oz)   Height: 5' 5" (165.1 cm)   Body mass index is 27.48 kg/m².    Physical Exam    Vitals: Weight: 165 lbs 2 ounces.  General: No acute distress. Well-developed. Well-nourished.  Eyes: EOMI. Sclerae anicteric.  HENT: Normocephalic. Atraumatic. Nares patent. Moist oral mucosa.  Respiratory: Normal respiratory effort. Clear to auscultation bilaterally. No rales. No rhonchi. No wheezing.  Abdomen: Soft. Non-tender. Non-distended. Normoactive bowel sounds.  Musculoskeletal: No  obvious deformity.  Extremities: No lower extremity edema.  Neurological: Alert & oriented x3. No slurred speech. Normal gait.  Psychiatric: Normal mood. Normal affect. Good insight. Good judgment.  Skin: Warm. Dry. No rash.             ASSESSMENT AND PLAN:  Assessment & Plan    Continued tirzepatide 5mg for weight management, effectively controlling appetite and sugar cravings  Assessed thyroid function due to significant weight change, which may affect levothyroxine dosing  Recommend continuation of current diet and exercise regimen, with emphasis on protein intake and resistance training for muscle mass preservation  Monitored for potential side effects of tirzepatide, particularly acute pancreatitis    Colon cancer screening  -     Ambulatory referral/consult to Endo Procedure ; Future; Expected date: 11/21/2024    Healthcare maintenance  -     Ambulatory referral/consult to Endo Procedure ; Future; Expected date: 11/21/2024  -     T4, Free; Future; Expected date: 11/20/2024  -     Hemoglobin A1C; Future; Expected date: 11/20/2024  -     Comprehensive Metabolic Panel; Future; Expected date: 11/20/2024  -     Lipid Panel; Future; " Expected date: 11/20/2024  -     CBC Auto Differential; Future; Expected date: 11/20/2024  -     TSH; Future; Expected date: 11/20/2024    Class 1 obesity due to excess calories without serious comorbidity with body mass index (BMI) of 32.0 to 32.9 in adult    - Educated on the importance of protein intake for weight loss maintenance.  - Discussed the significance of resistance training in maintaining muscle mass during weight loss.  - Mirta to incorporate resistance training exercises with weights at least 2 times per week.  - Recommend aiming for  g of protein intake daily.  - Mirta to continue current dietary approach, including keto-style eating with some carb allowance.    - Explained the rare but serious side effect of acute pancreatitis with tirzepatide use, instructing to discontinue and seek emergency care if severe abdominal pain occurs.  - Continued tirzepatide 5mg.  - Continued levothyroxine at current dose, pending thyroid function test results.  - Continued gabapentin for occasional use as needed for sleep and hot flashes.    - Referred for colon cancer screening due to previous polyps found 5 years ago.  - Follow up to schedule colon cancer screening after January, avoiding holiday season.    - Thyroid function test ordered.  - Fasting comprehensive metabolic panel ordered for the first week of December.    Follow up 3 months or sooner as needed.    This note was generated with the assistance of ambient listening technology. Verbal consent was obtained by the patient and accompanying visitor(s) for the recording of patient appointment to facilitate this note. I attest to having reviewed and edited the generated note for accuracy, though some syntax or spelling errors may persist. Please contact the author of this note for any clarification.

## 2024-12-16 NOTE — ASSESSMENT & PLAN NOTE
----- Message from Justo Kathleen sent at 12/16/2024  9:26 AM CST -----  Regarding: scheduling  Going through old inboxes, she never follow-up with me. If she wants a follow-up we can schedule next available, then I can add some labs   Per CT rep[ort and clonical  Presentation,[facility] has been consulted,keep NPO past MN.

## 2025-03-14 ENCOUNTER — LAB VISIT (OUTPATIENT)
Dept: LAB | Facility: HOSPITAL | Age: 60
End: 2025-03-14
Attending: INTERNAL MEDICINE
Payer: COMMERCIAL

## 2025-03-14 DIAGNOSIS — Z00.00 HEALTHCARE MAINTENANCE: ICD-10-CM

## 2025-03-14 LAB
ALBUMIN SERPL BCP-MCNC: 3.9 G/DL (ref 3.5–5.2)
ALP SERPL-CCNC: 65 U/L (ref 40–150)
ALT SERPL W/O P-5'-P-CCNC: 12 U/L (ref 10–44)
ANION GAP SERPL CALC-SCNC: 7 MMOL/L (ref 8–16)
AST SERPL-CCNC: 17 U/L (ref 10–40)
BASOPHILS # BLD AUTO: 0.04 K/UL (ref 0–0.2)
BASOPHILS NFR BLD: 0.8 % (ref 0–1.9)
BILIRUB SERPL-MCNC: 0.6 MG/DL (ref 0.1–1)
BUN SERPL-MCNC: 15 MG/DL (ref 8–23)
CALCIUM SERPL-MCNC: 9.3 MG/DL (ref 8.7–10.5)
CHLORIDE SERPL-SCNC: 107 MMOL/L (ref 95–110)
CHOLEST SERPL-MCNC: 176 MG/DL (ref 120–199)
CHOLEST/HDLC SERPL: 4 {RATIO} (ref 2–5)
CO2 SERPL-SCNC: 27 MMOL/L (ref 23–29)
CREAT SERPL-MCNC: 0.8 MG/DL (ref 0.5–1.4)
DIFFERENTIAL METHOD BLD: NORMAL
EOSINOPHIL # BLD AUTO: 0.1 K/UL (ref 0–0.5)
EOSINOPHIL NFR BLD: 2.6 % (ref 0–8)
ERYTHROCYTE [DISTWIDTH] IN BLOOD BY AUTOMATED COUNT: 12 % (ref 11.5–14.5)
EST. GFR  (NO RACE VARIABLE): >60 ML/MIN/1.73 M^2
GLUCOSE SERPL-MCNC: 69 MG/DL (ref 70–110)
HCT VFR BLD AUTO: 38 % (ref 37–48.5)
HDLC SERPL-MCNC: 44 MG/DL (ref 40–75)
HDLC SERPL: 25 % (ref 20–50)
HGB BLD-MCNC: 12.6 G/DL (ref 12–16)
IMM GRANULOCYTES # BLD AUTO: 0.01 K/UL (ref 0–0.04)
IMM GRANULOCYTES NFR BLD AUTO: 0.2 % (ref 0–0.5)
LDLC SERPL CALC-MCNC: 115.2 MG/DL (ref 63–159)
LYMPHOCYTES # BLD AUTO: 1.6 K/UL (ref 1–4.8)
LYMPHOCYTES NFR BLD: 31.8 % (ref 18–48)
MCH RBC QN AUTO: 29.9 PG (ref 27–31)
MCHC RBC AUTO-ENTMCNC: 33.2 G/DL (ref 32–36)
MCV RBC AUTO: 90 FL (ref 82–98)
MONOCYTES # BLD AUTO: 0.4 K/UL (ref 0.3–1)
MONOCYTES NFR BLD: 7.3 % (ref 4–15)
NEUTROPHILS # BLD AUTO: 2.8 K/UL (ref 1.8–7.7)
NEUTROPHILS NFR BLD: 57.3 % (ref 38–73)
NONHDLC SERPL-MCNC: 132 MG/DL
NRBC BLD-RTO: 0 /100 WBC
PLATELET # BLD AUTO: 336 K/UL (ref 150–450)
PMV BLD AUTO: 10.1 FL (ref 9.2–12.9)
POTASSIUM SERPL-SCNC: 3.8 MMOL/L (ref 3.5–5.1)
PROT SERPL-MCNC: 7 G/DL (ref 6–8.4)
RBC # BLD AUTO: 4.22 M/UL (ref 4–5.4)
SODIUM SERPL-SCNC: 141 MMOL/L (ref 136–145)
T4 FREE SERPL-MCNC: 1.17 NG/DL (ref 0.71–1.51)
TRIGL SERPL-MCNC: 84 MG/DL (ref 30–150)
TSH SERPL DL<=0.005 MIU/L-ACNC: 0.27 UIU/ML (ref 0.4–4)
WBC # BLD AUTO: 4.93 K/UL (ref 3.9–12.7)

## 2025-03-14 PROCEDURE — 84443 ASSAY THYROID STIM HORMONE: CPT | Performed by: INTERNAL MEDICINE

## 2025-03-14 PROCEDURE — 84439 ASSAY OF FREE THYROXINE: CPT | Performed by: INTERNAL MEDICINE

## 2025-03-14 PROCEDURE — 36415 COLL VENOUS BLD VENIPUNCTURE: CPT | Mod: PN | Performed by: INTERNAL MEDICINE

## 2025-03-14 PROCEDURE — 80053 COMPREHEN METABOLIC PANEL: CPT | Performed by: INTERNAL MEDICINE

## 2025-03-14 PROCEDURE — 85025 COMPLETE CBC W/AUTO DIFF WBC: CPT | Performed by: INTERNAL MEDICINE

## 2025-03-14 PROCEDURE — 83036 HEMOGLOBIN GLYCOSYLATED A1C: CPT | Performed by: INTERNAL MEDICINE

## 2025-03-14 PROCEDURE — 80061 LIPID PANEL: CPT | Performed by: INTERNAL MEDICINE

## 2025-03-15 LAB
ESTIMATED AVG GLUCOSE: 97 MG/DL (ref 68–131)
HBA1C MFR BLD: 5 % (ref 4–5.6)

## 2025-03-24 ENCOUNTER — TELEPHONE (OUTPATIENT)
Dept: FAMILY MEDICINE | Facility: CLINIC | Age: 60
End: 2025-03-24
Payer: COMMERCIAL

## 2025-03-24 NOTE — TELEPHONE ENCOUNTER
----- Message from Cierra sent at 3/24/2025 11:09 AM CDT -----  Regarding: self  Who called: selfWhat is the request in detail: pt is trying to see about a later time for today due to weather. Or a sooner date than next avail 4/2. Pt is avail Monday and Wednesdays and wants something sooner than two weeks because she has other issues she wants to speak with office aboutCan the clinic reply by [facility]? NoWould the patient rather a call back or a response via [facility]? Call [facility] call back number:  [phone] Information:Thank you.

## 2025-03-24 NOTE — TELEPHONE ENCOUNTER
Returned call to Mirta who's requesting another appointment due to weather. Nurse explained next available is 04/02/2024. Patient declined explaining that she has an ear ache and needs something sooner. Nurse offered an another appointment with another provider with a sooner date. Patient declined. Patient also explained if unable to make appointment Urgent Care and or ER is available. Patient stated she will try to make the appointment.

## 2025-03-26 ENCOUNTER — OFFICE VISIT (OUTPATIENT)
Dept: FAMILY MEDICINE | Facility: CLINIC | Age: 60
End: 2025-03-26
Payer: COMMERCIAL

## 2025-03-26 VITALS
DIASTOLIC BLOOD PRESSURE: 70 MMHG | HEART RATE: 80 BPM | HEIGHT: 65 IN | SYSTOLIC BLOOD PRESSURE: 102 MMHG | OXYGEN SATURATION: 97 % | WEIGHT: 154.56 LBS | BODY MASS INDEX: 25.75 KG/M2 | TEMPERATURE: 98 F

## 2025-03-26 DIAGNOSIS — E66.09 CLASS 1 OBESITY DUE TO EXCESS CALORIES WITHOUT SERIOUS COMORBIDITY WITH BODY MASS INDEX (BMI) OF 32.0 TO 32.9 IN ADULT: ICD-10-CM

## 2025-03-26 DIAGNOSIS — E66.811 CLASS 1 OBESITY DUE TO EXCESS CALORIES WITHOUT SERIOUS COMORBIDITY WITH BODY MASS INDEX (BMI) OF 32.0 TO 32.9 IN ADULT: ICD-10-CM

## 2025-03-26 DIAGNOSIS — H66.91 RIGHT OTITIS MEDIA, UNSPECIFIED OTITIS MEDIA TYPE: Primary | ICD-10-CM

## 2025-03-26 DIAGNOSIS — M54.2 NECK PAIN: ICD-10-CM

## 2025-03-26 DIAGNOSIS — S16.1XXA STRAIN OF NECK MUSCLE, INITIAL ENCOUNTER: ICD-10-CM

## 2025-03-26 DIAGNOSIS — Z12.11 COLON CANCER SCREENING: ICD-10-CM

## 2025-03-26 DIAGNOSIS — Z76.89 ENCOUNTER FOR WEIGHT MANAGEMENT: ICD-10-CM

## 2025-03-26 DIAGNOSIS — Z00.00 HEALTHCARE MAINTENANCE: ICD-10-CM

## 2025-03-26 DIAGNOSIS — Z78.0 ASYMPTOMATIC POSTMENOPAUSAL STATE: ICD-10-CM

## 2025-03-26 DIAGNOSIS — E03.9 HYPOTHYROIDISM, UNSPECIFIED TYPE: ICD-10-CM

## 2025-03-26 PROCEDURE — 99999 PR PBB SHADOW E&M-EST. PATIENT-LVL V: CPT | Mod: PBBFAC,,, | Performed by: INTERNAL MEDICINE

## 2025-03-26 RX ORDER — TIZANIDINE 2 MG/1
2 TABLET ORAL EVERY 8 HOURS PRN
Qty: 30 TABLET | Refills: 0 | Status: SHIPPED | OUTPATIENT
Start: 2025-03-26 | End: 2025-04-05

## 2025-03-26 RX ORDER — METHYLPREDNISOLONE 4 MG/1
TABLET ORAL
Qty: 1 EACH | Refills: 0 | Status: SHIPPED | OUTPATIENT
Start: 2025-03-26

## 2025-03-26 RX ORDER — LEVOTHYROXINE SODIUM 100 UG/1
100 TABLET ORAL
Qty: 30 TABLET | Refills: 11 | Status: SHIPPED | OUTPATIENT
Start: 2025-03-26 | End: 2026-03-26

## 2025-03-26 RX ORDER — AMOXICILLIN AND CLAVULANATE POTASSIUM 875; 125 MG/1; MG/1
1 TABLET, FILM COATED ORAL 2 TIMES DAILY
Qty: 14 TABLET | Refills: 0 | Status: SHIPPED | OUTPATIENT
Start: 2025-03-26 | End: 2025-04-02

## 2025-03-26 RX ORDER — TIRZEPATIDE 5 MG/.5ML
5 INJECTION, SOLUTION SUBCUTANEOUS
Qty: 2 ML | Refills: 2 | Status: SHIPPED | OUTPATIENT
Start: 2025-03-26

## 2025-03-26 NOTE — PROGRESS NOTES
"HISTORY OF PRESENT ILLNESS:  Mirta Perry is a 60 y.o. female who presents to the clinic today for Follow-up and Neck Pain (Neck pain X 3 weeks)    Last seen by me 11/2024.    Mirta presents today with ear discomfort and neck pain for three weeks.    She reports she experienced an ear infection three weeks ago that resolved within two days on its own. She reports recurring episodes of ear fullness and muffled hearing lasting throughout the day with inability to relieve pressure. She denies current ear pain, hearing loss, dizziness, or vertigo.    She reports severe neck pain that began 3 weeks ago, radiating from skull across ear. Pain is most severe at night, making head movement extremely difficult, particularly when turning over in bed. During daytime hours, pain is present but less intense with movement. She denies numbness, tingling, or radiating pain down the extremities. She attributes symptoms possibly to caring for 1-year-old grandbaby.    TSH was 0.271, below the lower limit of normal of 0.4, indicating thyroid medication dose is too high for current weight.    She follows a modified keto diet.   On tirzepatide 5 mg weekly.    Wt Readings from Last 3 Encounters:   03/26/25 1129 70.1 kg (154 lb 8.7 oz)   11/20/24 1456 74.9 kg (165 lb 2 oz)   07/17/24 1535 84.2 kg (185 lb 10 oz)        Estimated body mass index is 27.48 kg/m² as calculated from the following:    Height as of 11/20/24: 5' 5" (1.651 m).    Weight as of 11/20/24: 74.9 kg (165 lb 2 oz).    ROS:  General: -fever, -chills, -fatigue, -weight gain, -weight loss  Eyes: -vision changes, -redness, -discharge  ENT: -ear pain, -nasal congestion, -sore throat, +ear pressure, +difficulty hearing, +hearing loss, +nosebleeds  Cardiovascular: -chest pain, -palpitations, -lower extremity edema  Respiratory: -cough, -shortness of breath  Gastrointestinal: -abdominal pain, -nausea, -vomiting, -diarrhea, -constipation, -blood in stool  Genitourinary: " -dysuria, -hematuria, -frequency  Musculoskeletal: -joint pain, -muscle pain, +neck stiffness, +neck pain  Skin: -rash, -lesion  Neurological: -headache, -dizziness, -numbness, -tingling  Psychiatric: -anxiety, -depression, -sleep difficulty             PAST MEDICAL HISTORY:  Past Medical History:   Diagnosis Date    Fatigue     Fibroids     uterine    Hernia, ventral     s/p cholectectomy    Hypothyroidism, acquired     Migraines        PAST SURGICAL HISTORY:  Past Surgical History:   Procedure Laterality Date    APPENDECTOMY Bilateral     tummy tuck    AUGMENTATION OF BREAST Bilateral     BREAST SURGERY Bilateral     augmentation    CARPAL TUNNEL RELEASE Right 05/2014    CHOLECYSTECTOMY      laparoscopic    COLONOSCOPY N/A 11/12/2019    Procedure: COLONOSCOPY;  Surgeon: Malick Rogers II, MD;  Location: [facility];  Service: Endoscopy;  Laterality: N/A;  confirmed appt-sp    COSMETIC SURGERY      HERNIA REPAIR      HYSTERECTOMY      LAPAROSCOPIC SLEEVE GASTRECTOMY  2016    [facility]    meniscal surgery  05/2014    left knee    OOPHORECTOMY      1 ov removed    rotator cuff surgery Right     TUBAL LIGATION      bitateral       SOCIAL HISTORY:  Social History[1]    FAMILY HISTORY:  Family History   Problem Relation Name Age of Onset    Diabetes Mellitus Mother Desiree Mcgarry     Arthritis Mother Desiree Mcgarry     Thyroid cancer Father Rohan Mcgarry         daughter     Parkinsonism Father Rohan Mcgarry     Heart disease Father Rohan Mcgarry     Cancer Father Rohan Mcgarry     Early death Father Rohan Mcgarry     Breast cancer Maternal Grandmother Roxanna Cotto     Arthritis Maternal Grandmother Roxanna Cotto     Cancer Maternal Grandmother Roxanna Cotto     Breast cancer Paternal Grandmother Cielo Mcgarry     Cancer Paternal Grandmother Cielo Mcgarry     Birth defects Daughter Granddaughter Meenakshi Mi Perry     Cancer Daughter Naomie Vicky Luceroardabrahan     Early death Daughter Granddaughter-  "Meenakshi Perry     Mental illness Son Dayron Perry     Miscarriages / Stillbirths Son Alfonso Perry     Cancer Daughter Kayla Duke         Thyroid       ALLERGIES AND MEDICATIONS: updated and reviewed.  Review of patient's allergies indicates:   Allergen Reactions    Ciprofloxacin     Percocet  [oxycodone-acetaminophen]      Other reaction(s): Hives     Medication List with Changes/Refills   New Medications    AMOXICILLIN-CLAVULANATE 875-125MG (AUGMENTIN) 875-125 MG PER TABLET    Take 1 tablet by mouth 2 (two) times daily. for 7 days    LEVOTHYROXINE (SYNTHROID) 100 MCG TABLET    Take 1 tablet (100 mcg total) by mouth before breakfast.    METHYLPREDNISOLONE (MEDROL DOSEPACK) 4 MG TABLET    use as directed    TIZANIDINE (ZANAFLEX) 2 MG TABLET    Take 1 tablet (2 mg total) by mouth every 8 (eight) hours as needed (muscle spasm).   Current Medications    GABAPENTIN (NEURONTIN) 300 MG CAPSULE    Take 1 capsule (300 mg total) by mouth every evening.   Changed and/or Refilled Medications    Modified Medication Previous Medication    TIRZEPATIDE, WEIGHT LOSS, (ZEPBOUND) 5 MG/0.5 ML PNIJ tirzepatide, weight loss, (ZEPBOUND) 5 mg/0.5 mL PnIj       Inject 5 mg into the skin every 7 days.    Inject 5 mg into the skin every 7 days.   Discontinued Medications    LEVOTHYROXINE (SYNTHROID) 112 MCG TABLET    Take 1 tablet (112 mcg total) by mouth before breakfast.          CARE TEAM:  Patient Care Team:  Steve Quintanilla MD as PCP - General (Internal Medicine)         PHYSICAL EXAM:   Vitals:    03/26/25 1129   BP: 102/70   Pulse: 80   Temp: 98.4 °F (36.9 °C)     Weight: 70.1 kg (154 lb 8.7 oz)   Height: 5' 5" (165.1 cm)   Body mass index is 25.72 kg/m².    Physical Exam    Vitals: Weight: 154 lbs, 8.7 ounces.  General: No acute distress. Well-developed. Well-nourished.  Eyes: EOMI. Sclerae anicteric.  HENT: Normocephalic. Atraumatic. Nares patent. Moist oral mucosa.  Ears: Fluid behind the " tympanic membrane on the right side. Bilateral EACs clear.  Cardiovascular: Regular rate. Regular rhythm. No murmurs. No rubs. No gallops. Normal S1, S2.  Respiratory: Normal respiratory effort. Clear to auscultation bilaterally. No rales. No rhonchi. No wheezing.  Abdomen: Soft. Non-tender. Non-distended. Normoactive bowel sounds.  Musculoskeletal: No  obvious deformity.  Extremities: No lower extremity edema.  Neurological: Alert & oriented x3. No slurred speech. Normal gait.  Psychiatric: Normal mood. Normal affect. Good insight. Good judgment.  Skin: Warm. Dry. No rash.  Neck: Tenderness in the mastoid process area. Decreased range of motion in neck. Limited range of motion.             ASSESSMENT AND PLAN:  IMPRESSION:  - Assessed right ear discomfort and neck pain.  - Diagnosed otitis media in right ear due to fluid behind tympanic membrane.  - Diagnosed cervical strain, likely coincidental with ear issues.  - Evaluated thyroid function, noted abnormal results likely due to weight loss.  - Determined current thyroid medication dose too high for patient's current weight.      Right otitis media, unspecified otitis media type  -     amoxicillin-clavulanate 875-125mg (AUGMENTIN) 875-125 mg per tablet; Take 1 tablet by mouth 2 (two) times daily. for 7 days  Dispense: 14 tablet; Refill: 0  - Prescribed Augmentin twice daily for otitis media.  - Examined the patient's ear and found fluid behind the tympanic membrane on the right side, with a cloudy appearance and tenderness but no redness.  - Assessed the condition as an inner ear infection, ruling out more serious conditions like mastoiditis.  - Advised the patient to monitor ongoing symptoms of ear fullness and muffled hearing.    Neck pain  -     X-Ray Cervical Spine AP And Lateral; Future; Expected date: 03/26/2025    Strain of neck muscle, initial encounter  -     methylPREDNISolone (MEDROL DOSEPACK) 4 mg tablet; use as directed  Dispense: 1 each; Refill: 0  -      tiZANidine (ZANAFLEX) 2 MG tablet; Take 1 tablet (2 mg total) by mouth every 8 (eight) hours as needed (muscle spasm).  Dispense: 30 tablet; Refill: 0  - Prescribed Medrol Dosepak, a 6-day tapering dose steroid pack, to be taken as per instructions on the package.  - Prescribed Tizanidine as a muscle relaxant, to be taken every 8 hours as needed, with recommendation to take at night if causing drowsiness.  - Cautioned the patient about potential drowsiness caused by the muscle relaxant.  - Prescribed Medrol Dosepak (steroid pack) for treatment.  - Recommend anti-inflammatories, heat/ice therapy, and neck exercises.  - Examined the neck, finding limited range of motion and tenderness, with partial ability to turn head right and left, and pain when looking up.  - Assessed the condition as cervical strain.  - Advised the patient to monitor severe neck pain, which has been worsening over time, especially at night.  - Explained neck pain exercises and provided printed information.  - Mirta to apply heat to neck in the morning to loosen muscles.  - Mirta to use ice pack on neck at end of day if feeling inflamed.    Class 1 obesity due to excess calories without serious comorbidity with body mass index (BMI) of 32.0 to 32.9 in adult  Encounter for weight management  -     tirzepatide, weight loss, (ZEPBOUND) 5 mg/0.5 mL PnIj; Inject 5 mg into the skin every 7 days.  Dispense: 2 mL; Refill: 2    Hypothyroidism, unspecified type  -     levothyroxine (SYNTHROID) 100 MCG tablet; Take 1 tablet (100 mcg total) by mouth before breakfast.  Dispense: 30 tablet; Refill: 11  -     T4, Free; Future; Expected date: 05/26/2025  -     TSH; Future; Expected date: 05/26/2025  - Decreased levothyroxine dosage from 112 mcg to 100 mcg daily.  - Assessed that the thyroid medication dose needs to be reduced due to weight loss.  - Ordered thyroid function tests to be rechecked in 2 months.  - Evaluated TSH level at 0.271, below the lower limit  of normal (0.4), indicating the current thyroid medication dose is too high.    Colon cancer screening  -     Ambulatory referral/consult to Endo Procedure ; Future; Expected date: 03/27/2025    Asymptomatic postmenopausal state  -     DXA Bone Density Axial Skeleton 1 or more sites; Future; Expected date: 03/26/2025                   Follow up 3 months or sooner as needed.    This note was generated with the assistance of ambient listening technology. Verbal consent was obtained by the patient and accompanying visitor(s) for the recording of patient appointment to facilitate this note. I attest to having reviewed and edited the generated note for accuracy, though some syntax or spelling errors may persist. Please contact the author of this note for any clarification.           [1]   Social History  Socioeconomic History    Marital status:      Spouse name: michael    Number of children: 4    Years of education: college   Occupational History    Occupation:      Employer: [employer]   Tobacco Use    Smoking status: Never    Smokeless tobacco: Never   Substance and Sexual Activity    Alcohol use: Never    Drug use: Never    Sexual activity: Yes     Partners: Male     Birth control/protection: See Surgical Hx, None   Social History Narrative    Adult Screenings updated and reviewed  6/5/14    Mammogram( for females) overdue     Pap ( for females)overdue    Colonoscopy age  49- not done yet     Flu shot yearly not done for 2013     Td ?    Pneumovax recommended one time  at age  64    Zostavax recommended one time at  age  59    Eye exam 2 years Bone density - not done          U/s thyroid  4/24/14     Social Drivers of Health     Financial Resource Strain: Low Risk  (3/25/2025)    Overall Financial Resource Strain (CARDIA)     Difficulty of Paying Living Expenses: Not hard at all   Food Insecurity: No Food Insecurity (3/25/2025)    Hunger Vital Sign     Worried About Running Out of Food in  the Last Year: Never true     Ran Out of Food in the Last Year: Never true   Transportation Needs: No Transportation Needs (3/25/2025)    PRAPARE - Transportation     Lack of Transportation (Medical): No     Lack of Transportation (Non-Medical): No   Physical Activity: Insufficiently Active (3/25/2025)    Exercise Vital Sign     Days of Exercise per Week: 2 days     Minutes of Exercise per Session: 60 min   Stress: No Stress Concern Present (3/25/2025)    [employer] [place] [employer] - Occupational Stress Questionnaire     Feeling of Stress : Not at all   Housing Stability: Low Risk  (3/25/2025)    Housing Stability Vital Sign     Unable to Pay for Housing in the Last Year: No     Number of Times Moved in the Last Year: 0     Homeless in the Last Year: No

## 2025-04-06 ENCOUNTER — PATIENT MESSAGE (OUTPATIENT)
Dept: ADMINISTRATIVE | Facility: HOSPITAL | Age: 60
End: 2025-04-06
Payer: COMMERCIAL

## 2025-04-06 ENCOUNTER — PATIENT MESSAGE (OUTPATIENT)
Dept: FAMILY MEDICINE | Facility: CLINIC | Age: 60
End: 2025-04-06
Payer: COMMERCIAL

## 2025-04-07 ENCOUNTER — TELEPHONE (OUTPATIENT)
Dept: ENDOSCOPY | Facility: HOSPITAL | Age: 60
End: 2025-04-07
Payer: COMMERCIAL

## 2025-04-07 ENCOUNTER — PATIENT MESSAGE (OUTPATIENT)
Dept: FAMILY MEDICINE | Facility: CLINIC | Age: 60
End: 2025-04-07
Payer: COMMERCIAL

## 2025-04-07 ENCOUNTER — CLINICAL SUPPORT (OUTPATIENT)
Dept: ENDOSCOPY | Facility: HOSPITAL | Age: 60
End: 2025-04-07
Attending: INTERNAL MEDICINE
Payer: COMMERCIAL

## 2025-04-07 ENCOUNTER — PATIENT OUTREACH (OUTPATIENT)
Dept: ADMINISTRATIVE | Facility: HOSPITAL | Age: 60
End: 2025-04-07
Payer: COMMERCIAL

## 2025-04-07 ENCOUNTER — TELEPHONE (OUTPATIENT)
Dept: ENDOSCOPY | Facility: HOSPITAL | Age: 60
End: 2025-04-07

## 2025-04-07 VITALS — BODY MASS INDEX: 24.99 KG/M2 | WEIGHT: 150 LBS | HEIGHT: 65 IN

## 2025-04-07 DIAGNOSIS — Z12.11 COLON CANCER SCREENING: ICD-10-CM

## 2025-04-07 DIAGNOSIS — B37.9 YEAST INFECTION: Primary | ICD-10-CM

## 2025-04-07 DIAGNOSIS — Z12.11 ENCOUNTER FOR SCREENING COLONOSCOPY: Primary | ICD-10-CM

## 2025-04-07 DIAGNOSIS — Z00.00 HEALTHCARE MAINTENANCE: ICD-10-CM

## 2025-04-07 RX ORDER — FLUCONAZOLE 150 MG/1
150 TABLET ORAL ONCE
Qty: 1 TABLET | Refills: 0 | Status: SHIPPED | OUTPATIENT
Start: 2025-04-07 | End: 2025-04-07

## 2025-04-07 RX ORDER — SOD SULF/POT CHLORIDE/MAG SULF 1.479 G
12 TABLET ORAL DAILY
Qty: 24 TABLET | Refills: 0 | Status: SHIPPED | OUTPATIENT
Start: 2025-04-07

## 2025-04-07 NOTE — TELEPHONE ENCOUNTER
"Contact Mirta who stated to disregard the message per scheduling assistance with colonoscopy/Endo. But, did request a RX for yeast infection. Patient explained that she took her last dose of ABT on yesterday 4/6/2025 and her symptoms started two days ago. Patient stated this is normal for her to get a yeast infection while on ABT. Nurse asked patient what are her s/s? Mirta replied, "I am itchy and I have a vaginal discharge that is milky white with a yellow tent to it." Nurse explained this may require an OV, however her request will be sent to Steve Quintanilla MD. Please give MD at least 72 hours to responses to the message. Patient verbalize understanding.   "

## 2025-04-07 NOTE — TELEPHONE ENCOUNTER
Last Office Visit Info:   The patient's last visit with Steve Quintanilla MD was on 3/26/2025.    The patient's last visit in current department was on 3/26/2025.      Upcoming appointment 07/07/2025.

## 2025-04-07 NOTE — TELEPHONE ENCOUNTER
Referral for procedure from PAT appointment      Spoke to patient to schedule procedure(s) Colonoscopy       Physician to perform procedure(s) Dr. GERMAN Ca  Date of Procedure (s) 5/7/25  Arrival Time 8:45 AM  Time of Procedure(s) 9:45 AM   [place] [facility] [facility]   Type of Rx Prep sent to patient: [facility]  Instructions provided to patient via [facility]    Patient was informed on the following information and verbalized understanding. Screening questionnaire reviewed with patient and complete. If procedure requires anesthesia, a responsible adult needs to be present to accompany the patient home, patient cannot drive after receiving anesthesia. Appointment details are tentative, especially check-in time. Patient will receive a prep-op call 7 days prior to confirm check-in time for procedure. If applicable the patient should contact their pharmacy to verify Rx for procedure prep is ready for pick-up. Patient was advised to call the scheduling department at [phone] if pharmacy states no Rx is available. Patient was advised to call the endoscopy scheduling department if any questions or concerns arise.      [facility] Scheduling Department

## 2025-04-25 ENCOUNTER — HOSPITAL ENCOUNTER (OUTPATIENT)
Dept: RADIOLOGY | Facility: CLINIC | Age: 60
Discharge: HOME | End: 2025-04-25
Attending: INTERNAL MEDICINE
Payer: COMMERCIAL

## 2025-04-25 DIAGNOSIS — Z78.0 ASYMPTOMATIC POSTMENOPAUSAL STATE: ICD-10-CM

## 2025-04-25 PROCEDURE — 77080 DXA BONE DENSITY AXIAL: CPT | Mod: 26,,, | Performed by: INTERNAL MEDICINE

## 2025-04-25 PROCEDURE — 77080 DXA BONE DENSITY AXIAL: CPT | Mod: TC,PO

## 2025-05-07 ENCOUNTER — HOSPITAL ENCOUNTER (OUTPATIENT)
Facility: HOSPITAL | Age: 60
Discharge: HOME | End: 2025-05-07
Attending: INTERNAL MEDICINE | Admitting: INTERNAL MEDICINE
Payer: COMMERCIAL

## 2025-05-07 ENCOUNTER — ANESTHESIA (OUTPATIENT)
Dept: ENDOSCOPY | Facility: HOSPITAL | Age: 60
End: 2025-05-07
Payer: COMMERCIAL

## 2025-05-07 ENCOUNTER — ANESTHESIA EVENT (OUTPATIENT)
Dept: ENDOSCOPY | Facility: HOSPITAL | Age: 60
End: 2025-05-07
Payer: COMMERCIAL

## 2025-05-07 VITALS
DIASTOLIC BLOOD PRESSURE: 55 MMHG | RESPIRATION RATE: 20 BRPM | TEMPERATURE: 98 F | HEART RATE: 64 BPM | OXYGEN SATURATION: 98 % | SYSTOLIC BLOOD PRESSURE: 94 MMHG

## 2025-05-07 DIAGNOSIS — Z12.11 COLON CANCER SCREENING: ICD-10-CM

## 2025-05-07 PROCEDURE — 37000008 HC ANESTHESIA 1ST 15 MINUTES: Performed by: INTERNAL MEDICINE

## 2025-05-07 PROCEDURE — 25000003 PHARM REV CODE 250: Performed by: NURSE ANESTHETIST, CERTIFIED REGISTERED

## 2025-05-07 PROCEDURE — 63600175 PHARM REV CODE 636 W HCPCS: Performed by: NURSE ANESTHETIST, CERTIFIED REGISTERED

## 2025-05-07 PROCEDURE — G0105 COLORECTAL SCRN; HI RISK IND: HCPCS | Performed by: INTERNAL MEDICINE

## 2025-05-07 PROCEDURE — G0105 COLORECTAL SCRN; HI RISK IND: HCPCS | Mod: ,,, | Performed by: INTERNAL MEDICINE

## 2025-05-07 PROCEDURE — 37000009 HC ANESTHESIA EA ADD 15 MINS: Performed by: INTERNAL MEDICINE

## 2025-05-07 RX ORDER — LIDOCAINE HYDROCHLORIDE 20 MG/ML
INJECTION, SOLUTION EPIDURAL; INFILTRATION; INTRACAUDAL; PERINEURAL
Status: DISCONTINUED
Start: 2025-05-07 | End: 2025-05-07 | Stop reason: HOSPADM

## 2025-05-07 RX ORDER — PROPOFOL 10 MG/ML
VIAL (ML) INTRAVENOUS CONTINUOUS PRN
Status: DISCONTINUED | OUTPATIENT
Start: 2025-05-07 | End: 2025-05-07

## 2025-05-07 RX ORDER — IPRATROPIUM BROMIDE AND ALBUTEROL SULFATE 2.5; .5 MG/3ML; MG/3ML
3 SOLUTION RESPIRATORY (INHALATION)
Status: DISCONTINUED | OUTPATIENT
Start: 2025-05-07 | End: 2025-05-07 | Stop reason: HOSPADM

## 2025-05-07 RX ORDER — LIDOCAINE HYDROCHLORIDE 20 MG/ML
INJECTION INTRAVENOUS
Status: DISCONTINUED | OUTPATIENT
Start: 2025-05-07 | End: 2025-05-07

## 2025-05-07 RX ORDER — PROPOFOL 10 MG/ML
VIAL (ML) INTRAVENOUS
Status: DISCONTINUED
Start: 2025-05-07 | End: 2025-05-07 | Stop reason: HOSPADM

## 2025-05-07 RX ORDER — SODIUM CHLORIDE 9 MG/ML
INJECTION, SOLUTION INTRAVENOUS CONTINUOUS
Status: CANCELLED | OUTPATIENT
Start: 2025-05-07

## 2025-05-07 RX ORDER — PROPOFOL 10 MG/ML
VIAL (ML) INTRAVENOUS
Status: DISCONTINUED | OUTPATIENT
Start: 2025-05-07 | End: 2025-05-07

## 2025-05-07 RX ADMIN — LIDOCAINE HYDROCHLORIDE 50 MG: 20 INJECTION, SOLUTION INTRAVENOUS at 09:05

## 2025-05-07 RX ADMIN — PROPOFOL 70 MG: 10 INJECTION, EMULSION INTRAVENOUS at 09:05

## 2025-05-07 RX ADMIN — SODIUM CHLORIDE: 0.9 INJECTION, SOLUTION INTRAVENOUS at 09:05

## 2025-05-07 RX ADMIN — PROPOFOL 150 MCG/KG/MIN: 10 INJECTION, EMULSION INTRAVENOUS at 09:05

## 2025-05-07 NOTE — ANESTHESIA PREPROCEDURE EVALUATION
05/07/2025    Pre-operative evaluation for Procedure(s) (LRB):  COLONOSCOPY, SCREENING, LOW RISK PATIENT (N/A)    Mirta Perry is a 60 y.o. female     Patient Active Problem List   Diagnosis    Hypothyroidism, acquired    Migraines    Fibroids    Hernia, ventral    Vitamin D deficiency disease    Fatigue    Class 1 obesity due to excess calories without serious comorbidity with body mass index (BMI) of 32.0 to 32.9 in adult    Abnormal mammogram    Hypothyroidism    Colon cancer screening    History of acute pyelonephritis       Review of patient's allergies indicates:   Allergen Reactions    Ciprofloxacin     Percocet  [oxycodone-acetaminophen]      Other reaction(s): Hives       Medications Ordered Prior to Encounter[1]    Past Surgical History:   Procedure Laterality Date    APPENDECTOMY Bilateral     tummy tuck    AUGMENTATION OF BREAST Bilateral     BREAST SURGERY Bilateral     augmentation    CARPAL TUNNEL RELEASE Right 05/2014    CHOLECYSTECTOMY      laparoscopic    COLONOSCOPY N/A 11/12/2019    Procedure: COLONOSCOPY;  Surgeon: Malick Rogers II, MD;  Location: [facility];  Service: Endoscopy;  Laterality: N/A;  confirmed appt-sp    COSMETIC SURGERY      HERNIA REPAIR      HYSTERECTOMY      LAPAROSCOPIC SLEEVE GASTRECTOMY  2016    [facility]    meniscal surgery  05/2014    left knee    OOPHORECTOMY      1 ov removed    rotator cuff surgery Right     TUBAL LIGATION      bitateral       Social History[2]          Pre-op Assessment    I have reviewed the Patient Summary Reports.     I have reviewed the Nursing Notes.       Review of Systems  Anesthesia Hx:  No problems with previous Anesthesia                Social:  Non-Smoker       Hematology/Oncology:  Hematology Normal   Oncology Normal                                   EENT/Dental:  EENT/Dental Normal            Cardiovascular:  Cardiovascular Normal Exercise tolerance: good                                             Pulmonary:  Pulmonary Normal                       Renal/:  Renal/ Normal                 Hepatic/GI:  Bowel Prep. Denies PUD.  Denies Hiatal Hernia.  Denies GERD. Denies Liver Disease.  Denies Hepatitis.              Neurological:    Denies CVA.   Headaches Denies Seizures.     Dx of Headaches                           Endocrine:  Denies Diabetes. Hypothyroidism       Hypothyroidism        Obesity / BMI > 30  Dermatological:  Skin Normal    Psych:  Psychiatric Normal                    Physical Exam  General: Well nourished, Cooperative and Alert    Airway:  Mallampati: II   Mouth Opening: Normal  TM Distance: Normal  Tongue: Normal  Neck ROM: Normal ROM    Dental:  Intact    Chest/Lungs:  Normal Respiratory Rate    Heart:  Rate: Normal  Rhythm: Regular Rhythm  Sounds: Normal        Anesthesia Plan  Type of Anesthesia, risks & benefits discussed:    Anesthesia Type: Gen ETT  Intra-op Monitoring Plan: Standard ASA Monitors  Induction:  IV  Informed Consent: Informed consent signed with the Patient and all parties understand the risks and agree with anesthesia plan.  All questions answered.   ASA Score: 2    Ready For Surgery From Anesthesia Perspective.     .           [1]   No current facility-administered medications on file prior to encounter.     Current Outpatient Medications on File Prior to Encounter   Medication Sig Dispense Refill    gabapentin (NEURONTIN) 300 MG capsule Take 1 capsule (300 mg total) by mouth every evening. 30 capsule 2    levothyroxine (SYNTHROID) 100 MCG tablet Take 1 tablet (100 mcg total) by mouth before breakfast. 30 tablet 11    methylPREDNISolone (MEDROL DOSEPACK) 4 mg tablet use as directed 1 each 0    tirzepatide, weight loss, (ZEPBOUND) 5 mg/0.5 mL PnIj Inject 5 mg into the skin every 7 days. 2 mL 2   [2]   Social History  Socioeconomic History    Marital status:       Spouse name: michael    Number of children: 4    Years of education: college   Occupational History    Occupation:      Employer: [employer]   Tobacco Use    Smoking status: Never    Smokeless tobacco: Never   Substance and Sexual Activity    Alcohol use: Never    Drug use: Never    Sexual activity: Yes     Partners: Male     Birth control/protection: See Surgical Hx, None   Social History Narrative    Adult Screenings updated and reviewed  6/5/14    Mammogram( for females) overdue     Pap ( for females)overdue    Colonoscopy age  49- not done yet     Flu shot yearly not done for 2013     Td ?    Pneumovax recommended one time  at age  64    Zostavax recommended one time at  age  59    Eye exam 2 years Bone density - not done          U/s thyroid  4/24/14     Social Drivers of Health     Financial Resource Strain: Low Risk  (3/25/2025)    Overall Financial Resource Strain (CARDIA)     Difficulty of Paying Living Expenses: Not hard at all   Food Insecurity: No Food Insecurity (3/25/2025)    Hunger Vital Sign     Worried About Running Out of Food in the Last Year: Never true     Ran Out of Food in the Last Year: Never true   Transportation Needs: No Transportation Needs (3/25/2025)    PRAPARE - Transportation     Lack of Transportation (Medical): No     Lack of Transportation (Non-Medical): No   Physical Activity: Insufficiently Active (3/25/2025)    Exercise Vital Sign     Days of Exercise per Week: 2 days     Minutes of Exercise per Session: 60 min   Stress: No Stress Concern Present (3/25/2025)    [employer] [place] [employer] - Occupational Stress Questionnaire     Feeling of Stress : Not at all   Housing Stability: Low Risk  (3/25/2025)    Housing Stability Vital Sign     Unable to Pay for Housing in the Last Year: No     Number of Times Moved in the Last Year: 0     Homeless in the Last Year: No

## 2025-05-07 NOTE — TRANSFER OF CARE
Anesthesia Transfer of Care Note    Patient: Mirta Perry    Procedure(s) Performed: Procedure(s) (LRB):  COLONOSCOPY, SCREENING, LOW RISK PATIENT (N/A)    Patient location: GI    Anesthesia Type: general    Transport from OR: Transported from OR on room air with adequate spontaneous ventilation    Post pain: adequate analgesia    Post assessment: no apparent anesthetic complications and tolerated procedure well    Post vital signs: stable    Level of consciousness: awake, alert and oriented    Nausea/Vomiting: no nausea/vomiting    Complications: none    Transfer of care protocol was followed    Last vitals: Visit Vitals  BP (!) 88/54   Pulse 66   Temp 36.4 °C (97.5 °F)   Resp 14   SpO2 96%   Breastfeeding No

## 2025-05-07 NOTE — H&P
[facility]  Gastroenterology  H&P    Patient Name: Mirta Perry  MRN: [id number]  Admission Date: 5/7/2025  Code Status: Prior    Attending Provider: eder sanon  Primary Care Physician: Steve Quintanilla MD  Principal Problem:<principal problem not specified>    Subjective:     History of Present Illness: colon cancer screen    Past Medical History:   Diagnosis Date    Fatigue     Fibroids     uterine    Hernia, ventral     s/p cholectectomy    Hypothyroidism, acquired     Migraines        Past Surgical History:   Procedure Laterality Date    APPENDECTOMY Bilateral     tummy tuck    AUGMENTATION OF BREAST Bilateral     BREAST SURGERY Bilateral     augmentation    CARPAL TUNNEL RELEASE Right 05/2014    CHOLECYSTECTOMY      laparoscopic    COLONOSCOPY N/A 11/12/2019    Procedure: COLONOSCOPY;  Surgeon: Malick Rogers II, MD;  Location: [facility];  Service: Endoscopy;  Laterality: N/A;  confirmed appt-sp    COSMETIC SURGERY      HERNIA REPAIR      HYSTERECTOMY      LAPAROSCOPIC SLEEVE GASTRECTOMY  2016    [facility]    meniscal surgery  05/2014    left knee    OOPHORECTOMY      1 ov removed    rotator cuff surgery Right     TUBAL LIGATION      bitateral       Review of patient's allergies indicates:   Allergen Reactions    Ciprofloxacin     Percocet  [oxycodone-acetaminophen]      Other reaction(s): Hives     Family History       Problem Relation (Age of Onset)    Arthritis Mother, Maternal Grandmother    Birth defects Daughter    Breast cancer Maternal Grandmother, Paternal Grandmother    Cancer Father, Maternal Grandmother, Paternal Grandmother, Daughter, Daughter    Diabetes Mellitus Mother    Early death Father, Daughter    Heart disease Father    Mental illness Son    Miscarriages / Stillbirths Son    Parkinsonism Father    Thyroid cancer Father          Tobacco Use    Smoking status: Never    Smokeless tobacco: Never   Substance and Sexual Activity    Alcohol use: Never    Drug use: Never     Sexual activity: Yes     Partners: Male     Birth control/protection: See Surgical Hx, None     Review of Systems   Respiratory: Negative.     Cardiovascular: Negative.      Objective:     Vital Signs (Most Recent):  Temp: 98.3 °F (36.8 °C) (05/07/25 0903)  Pulse: 68 (05/07/25 0903)  Resp: 18 (05/07/25 0903)  BP: (!) 103/53 (05/07/25 0903)  SpO2: 98 % (05/07/25 0903) Vital Signs (24h Range):  Temp:  [98.3 °F (36.8 °C)] 98.3 °F (36.8 °C)  Pulse:  [68] 68  Resp:  [18] 18  SpO2:  [98 %] 98 %  BP: (103)/(53) 103/53        There is no height or weight on file to calculate BMI.    No intake or output data in the 24 hours ending 05/07/25 0943    Lines/Drains/Airways       Peripherally Inserted Central Catheter Line  Duration                  PICC Double Lumen 07/06/18 1145 right basilic 2496 days                    Physical Exam  Cardiovascular:      Rate and Rhythm: Normal rate and regular rhythm.   Pulmonary:      Effort: Pulmonary effort is normal.      Breath sounds: Normal breath sounds.         Significant Labs:      Significant Imaging:      Assessment/Plan:     There are no hospital problems to display for this patient.      Indication for procedure:    ASA:II  Airway normal  Malampati class:    Personal and family history negative for anesthesia problems    Plan:colon  Anesthesia plan: general      Deshawn Ca MD  Gastroenterology  [facility] - Endoscopy

## 2025-05-07 NOTE — ANESTHESIA POSTPROCEDURE EVALUATION
Anesthesia Post Evaluation    Patient: Mirta Perry    Procedure(s) Performed: Procedure(s) (LRB):  COLONOSCOPY, SCREENING, LOW RISK PATIENT (N/A)    Final Anesthesia Type: general      Patient location during evaluation: GI PACU  Patient participation: Yes- Able to Participate  Level of consciousness: awake and alert and oriented  Post-procedure vital signs: reviewed and stable  Pain management: adequate  Airway patency: patent    PONV status at discharge: No PONV  Anesthetic complications: no      Cardiovascular status: blood pressure returned to baseline and hemodynamically stable  Respiratory status: unassisted, spontaneous ventilation and room air  Hydration status: euvolemic  Follow-up not needed.              Vitals Value Taken Time   BP 94/55 05/07/25 10:56   Temp 36.4 °C (97.5 °F) 05/07/25 10:26   Pulse 64 05/07/25 10:56   Resp 20 05/07/25 10:56   SpO2 98 % 05/07/25 10:56         Event Time   Out of Recovery 10:57:59         Pain/Abiola Score: Abiola Score: 10 (5/7/2025 10:56 AM)

## 2025-05-07 NOTE — PROVATION PATIENT INSTRUCTIONS
Discharge Summary/Instructions after an Endoscopic Procedure  Patient Name: Mirta Perry  Patient MRN: [id number]  Patient YOB: 1965  Wednesday, May 7, 2025  Deshawn Ca MD  Dear patient,  As a result of recent federal legislation (The Federal Cures Act), you may   receive lab or pathology results from your procedure in your [facility]   account before your physician is able to contact you. Your physician or   their representative will relay the results to you with their   recommendations at their soonest availability.  Thank you,  RESTRICTIONS:  During your procedure today, you received medications for sedation.  These   medications may affect your judgment, balance and coordination.  Therefore,   for 24 hours, you have the following restrictions:   - DO NOT drive a car, operate machinery, make legal/financial decisions,   sign important papers or drink alcohol.    ACTIVITY:  Today: no heavy lifting, straining or running due to procedural   sedation/anesthesia.  The following day: return to full activity including work.  DIET:  Eat and drink normally unless instructed otherwise.     TREATMENT FOR COMMON SIDE EFFECTS:  - Mild abdominal pain, nausea, belching, bloating or excessive gas:  rest,   eat lightly and use a heating pad.  - Sore Throat: treat with throat lozenges and/or gargle with warm salt   water.  - Because air was used during the procedure, expelling large amounts of air   from your rectum or belching is normal.  - If a bowel prep was taken, you may not have a bowel movement for 1-3 days.    This is normal.  SYMPTOMS TO WATCH FOR AND REPORT TO YOUR PHYSICIAN:  1. Abdominal pain or bloating, other than gas cramps.  2. Chest pain.  3. Back pain.  4. Signs of infection such as: chills or fever occurring within 24 hours   after the procedure.  5. Rectal bleeding, which would show as bright red, maroon, or black stools.   (A tablespoon of blood from the rectum is not serious, especially if    hemorrhoids are present.)  6. Vomiting.  7. Weakness or dizziness.  GO DIRECTLY TO THE NEAREST EMERGENCY ROOM IF YOU HAVE ANY OF THE FOLLOWING:      Difficulty breathing              Chills and/or fever over 101 F   Persistent vomiting and/or vomiting blood   Severe abdominal pain   Severe chest pain   Black, tarry stools   Bleeding- more than one tablespoon   Any other symptom or condition that you feel may need urgent attention  Your doctor recommends these additional instructions:  If any biopsies were taken, your doctors clinic will contact you in 1 to 2   weeks with any results.  - Discharge patient to home (ambulatory).   - Patient has a contact number available for emergencies.  The signs and   symptoms of potential delayed complications were discussed with the   patient.  Return to normal activities tomorrow.  Written discharge   instructions were provided to the patient.   - Resume previous diet.   - Continue present medications.   - Return to primary care physician as previously scheduled.   - Repeat colonoscopy in 10 years for surveillance.  For questions, problems or results please call your physician - Deshawn Ca MD at Work:  [phone].  [facility] can be reached at [phone]     IF A COMPLICATION OR EMERGENCY SITUATION ARISES AND YOU ARE UNABLE TO REACH   YOUR PHYSICIAN - GO DIRECTLY TO THE EMERGENCY ROOM.  Deshawn Ca MD  5/7/2025 10:20:37 AM  This report has been verified and signed electronically.  Dear patient,  As a result of recent federal legislation (The Federal Cures Act), you may   receive lab or pathology results from your procedure in your [facility]   account before your physician is able to contact you. Your physician or   their representative will relay the results to you with their   recommendations at their soonest availability.  Thank you,  PROVATION

## 2025-05-21 ENCOUNTER — RESULTS FOLLOW-UP (OUTPATIENT)
Dept: FAMILY MEDICINE | Facility: CLINIC | Age: 60
End: 2025-05-21

## 2025-06-02 ENCOUNTER — PATIENT MESSAGE (OUTPATIENT)
Dept: FAMILY MEDICINE | Facility: CLINIC | Age: 60
End: 2025-06-02
Payer: COMMERCIAL

## 2025-06-03 ENCOUNTER — LAB VISIT (OUTPATIENT)
Dept: LAB | Facility: HOSPITAL | Age: 60
End: 2025-06-03
Attending: INTERNAL MEDICINE
Payer: COMMERCIAL

## 2025-06-03 DIAGNOSIS — Z00.00 HEALTHCARE MAINTENANCE: ICD-10-CM

## 2025-06-03 DIAGNOSIS — E03.9 HYPOTHYROIDISM, UNSPECIFIED TYPE: ICD-10-CM

## 2025-06-03 LAB
T4 FREE SERPL-MCNC: 0.89 NG/DL (ref 0.71–1.51)
T4 FREE SERPL-MCNC: 0.97 NG/DL (ref 0.71–1.51)
TSH SERPL-ACNC: 4.77 UIU/ML (ref 0.4–4)

## 2025-06-03 PROCEDURE — 36415 COLL VENOUS BLD VENIPUNCTURE: CPT | Mod: PN

## 2025-06-03 PROCEDURE — 84443 ASSAY THYROID STIM HORMONE: CPT

## 2025-06-09 DIAGNOSIS — R23.2 HOT FLASHES: ICD-10-CM

## 2025-06-09 NOTE — TELEPHONE ENCOUNTER
No care due was identified.  [facility] Embedded Care Due Messages. Reference number: [id number].   6/09/2025 4:26:55 PM CDT

## 2025-06-10 NOTE — TELEPHONE ENCOUNTER
Refill Routing Note   Medication(s) are not appropriate for processing by [facility] for the following reason(s):        Outside of protocol    [facility] action(s):  Route               Appointments  past 12m or future 3m with PCP    Date Provider   Last Visit   Visit date not found Rita Chawla MD   Next Visit   Visit date not found Rita Chawla MD   ED visits in past 90 days: 0        Note composed:7:20 AM 06/10/2025

## 2025-06-12 RX ORDER — GABAPENTIN 300 MG/1
300 CAPSULE ORAL NIGHTLY
Qty: 30 CAPSULE | Refills: 2 | Status: SHIPPED | OUTPATIENT
Start: 2025-06-12

## 2025-06-18 ENCOUNTER — TELEPHONE (OUTPATIENT)
Dept: FAMILY MEDICINE | Facility: CLINIC | Age: 60
End: 2025-06-18
Payer: COMMERCIAL

## 2025-06-18 DIAGNOSIS — Z12.31 BREAST CANCER SCREENING BY MAMMOGRAM: ICD-10-CM

## 2025-06-18 DIAGNOSIS — E03.9 HYPOTHYROIDISM, ACQUIRED: Primary | ICD-10-CM

## 2025-06-18 NOTE — TELEPHONE ENCOUNTER
----- Message from Steve Quintanilla MD sent at 6/18/2025  2:40 PM CDT -----  Please call to inform that thyroid function with very slight abnormality.  Continue current dose.  Advise to administer thyroid medication consistently in the morning on an empty stomach, at least 30 to 60 minutes before food. Do not administer within 4 hours of calcium- or iron-containing   products.      Schedule non fasting recheck TSH, T4 in 2-3 months.    Also schedule mammogram August 2025.  ----- Message -----  From: Lab, Background User  Sent: 6/3/2025   5:18 PM CDT  To: Steve Quintanilla MD

## 2025-07-07 ENCOUNTER — OFFICE VISIT (OUTPATIENT)
Dept: FAMILY MEDICINE | Facility: CLINIC | Age: 60
End: 2025-07-07
Payer: COMMERCIAL

## 2025-07-07 ENCOUNTER — APPOINTMENT (OUTPATIENT)
Dept: RADIOLOGY | Facility: HOSPITAL | Age: 60
End: 2025-07-07
Attending: INTERNAL MEDICINE
Payer: COMMERCIAL

## 2025-07-07 VITALS
DIASTOLIC BLOOD PRESSURE: 60 MMHG | SYSTOLIC BLOOD PRESSURE: 88 MMHG | WEIGHT: 144.81 LBS | OXYGEN SATURATION: 98 % | BODY MASS INDEX: 24.12 KG/M2 | HEIGHT: 65 IN | HEART RATE: 69 BPM | TEMPERATURE: 98 F

## 2025-07-07 DIAGNOSIS — E66.811 CLASS 1 OBESITY DUE TO EXCESS CALORIES WITHOUT SERIOUS COMORBIDITY WITH BODY MASS INDEX (BMI) OF 32.0 TO 32.9 IN ADULT: ICD-10-CM

## 2025-07-07 DIAGNOSIS — M79.644 FINGER PAIN, RIGHT: ICD-10-CM

## 2025-07-07 DIAGNOSIS — Z00.00 HEALTHCARE MAINTENANCE: ICD-10-CM

## 2025-07-07 DIAGNOSIS — E66.09 CLASS 1 OBESITY DUE TO EXCESS CALORIES WITHOUT SERIOUS COMORBIDITY WITH BODY MASS INDEX (BMI) OF 32.0 TO 32.9 IN ADULT: ICD-10-CM
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
## 2025-07-07 DIAGNOSIS — E03.9 HYPOTHYROIDISM, UNSPECIFIED TYPE: ICD-10-CM

## 2025-07-07 DIAGNOSIS — S63.636A SPRAIN OF INTERPHALANGEAL JOINT OF RIGHT LITTLE FINGER, INITIAL ENCOUNTER: Primary | ICD-10-CM

## 2025-07-07 PROCEDURE — 3074F SYST BP LT 130 MM HG: CPT | Mod: CPTII,S$GLB,, | Performed by: INTERNAL MEDICINE

## 2025-07-07 PROCEDURE — 3044F HG A1C LEVEL LT 7.0%: CPT | Mod: CPTII,S$GLB,, | Performed by: INTERNAL MEDICINE

## 2025-07-07 PROCEDURE — 73130 X-RAY EXAM OF HAND: CPT | Mod: TC,FY,PN,RT

## 2025-07-07 PROCEDURE — 3078F DIAST BP <80 MM HG: CPT | Mod: CPTII,S$GLB,, | Performed by: INTERNAL MEDICINE

## 2025-07-07 PROCEDURE — 3008F BODY MASS INDEX DOCD: CPT | Mod: CPTII,S$GLB,, | Performed by: INTERNAL MEDICINE

## 2025-07-07 PROCEDURE — 73130 X-RAY EXAM OF HAND: CPT | Mod: 26,RT,, | Performed by: INTERNAL MEDICINE

## 2025-07-07 PROCEDURE — 1159F MED LIST DOCD IN RCRD: CPT | Mod: CPTII,S$GLB,, | Performed by: INTERNAL MEDICINE

## 2025-07-07 PROCEDURE — 1160F RVW MEDS BY RX/DR IN RCRD: CPT | Mod: CPTII,S$GLB,, | Performed by: INTERNAL MEDICINE

## 2025-07-07 PROCEDURE — 99999 PR PBB SHADOW E&M-EST. PATIENT-LVL IV: CPT | Mod: PBBFAC,,, | Performed by: INTERNAL MEDICINE

## 2025-07-07 PROCEDURE — 99214 OFFICE O/P EST MOD 30 MIN: CPT | Mod: S$GLB,,, | Performed by: INTERNAL MEDICINE

## 2025-07-07 RX ORDER — LEVOTHYROXINE SODIUM 100 UG/1
100 TABLET ORAL
Qty: 30 TABLET | Refills: 11 | Status: SHIPPED | OUTPATIENT
Start: 2025-07-07 | End: 2026-07-07

## 2025-07-07 RX ORDER — SEMAGLUTIDE 0.5 MG/.5ML
0.5 INJECTION, SOLUTION SUBCUTANEOUS
Qty: 2 ML | Refills: 5 | Status: SHIPPED | OUTPATIENT
Start: 2025-07-07

## 2025-07-07 NOTE — PROGRESS NOTES
"HISTORY OF PRESENT ILLNESS:  Mirta Perry is a 60 y.o. female who presents to the clinic today for Follow-up and Hand Pain (Rt. Pinky finger painful X 2-3 weeks )  .     Last seen by me 3/2025.    Colonoscopy done 5/2025.    Mirta presents today with right finger pain and limited mobility after a fall    She fell 3-4 weeks ago while running down stairs with items in hand. The fall was partially attributed to new transition glasses, which made the bottom step appear unclear. She sustained a right finger injury with pain radiating up the arm and limited mobility. She reports severe pain with finger movement and notes altered finger appearance. She also experienced bruising, particularly of the knee. She denies head injury and has not sought prior medical evaluation. She has been self-treating with an OTC splint.    She reports prior history low blood pressure readings. She notes decreased fluid intake today.     She has a history of kidney stones during pregnancy approximately 40 years ago and past migraines which are currently not active.    Colonoscopy in May revealed diverticulosis. Mammogram is scheduled for August 1st at [facility].    Wt Readings from Last 3 Encounters:   07/07/25 1315 65.7 kg (144 lb 13.5 oz)   04/07/25 0920 68 kg (150 lb)   03/26/25 1129 70.1 kg (154 lb 8.7 oz)       7/17/2024   Vitals - 1 value per visit    Weight (lb) 185.63         3/12/2024   Vitals - 1 value per visit    Weight (lb) 196.43      Lost 40.79 lbs since starting Zepbound August 2024.  21.97% TBWL.    Estimated body mass index is 24.96 kg/m² as calculated from the following:    Height as of 4/7/25: 5' 5" (1.651 m).    Weight as of 4/7/25: 68 kg (150 lb).    ROS:  General: -fever, -chills, +fatigue, -weight gain, -weight loss  Eyes: -vision changes, -redness, -discharge  ENT: -ear pain, -nasal congestion, -sore throat  Cardiovascular: -chest pain, -palpitations, -lower extremity edema  Respiratory: -cough, -shortness " of breath  Gastrointestinal: -abdominal pain, -nausea, -vomiting, -diarrhea, -constipation, -blood in stool  Genitourinary: -dysuria, -hematuria, -frequency  Musculoskeletal: +joint pain, -muscle pain, +limb pain, +joint swelling, +limited movement, +pain with movement  Skin: -rash, -lesion, +easy bruising  Neurological: -headache, -dizziness, -numbness, -tingling  Psychiatric: -anxiety, -depression, -sleep difficulty             PAST MEDICAL HISTORY:  Past Medical History:   Diagnosis Date    Fatigue     Fibroids     uterine    Hernia, ventral     s/p cholectectomy    Hypothyroidism, acquired     Migraines        PAST SURGICAL HISTORY:  Past Surgical History:   Procedure Laterality Date    APPENDECTOMY Bilateral     tummy tuck    AUGMENTATION OF BREAST Bilateral     BREAST SURGERY Bilateral     augmentation    CARPAL TUNNEL RELEASE Right 05/2014    CHOLECYSTECTOMY      laparoscopic    COLONOSCOPY N/A 11/12/2019    Procedure: COLONOSCOPY;  Surgeon: Malick Rogers II, MD;  Location: [facility];  Service: Endoscopy;  Laterality: N/A;  confirmed appt-sp    COLONOSCOPY, SCREENING, LOW RISK PATIENT N/A 5/7/2025    Procedure: COLONOSCOPY, SCREENING, LOW RISK PATIENT;  Surgeon: Deshawn Ca MD;  Location: [facility];  Service: Endoscopy;  Laterality: N/A;    COSMETIC SURGERY      HERNIA REPAIR      HYSTERECTOMY      LAPAROSCOPIC SLEEVE GASTRECTOMY  [place]    meniscal surgery  05/2014    left knee    OOPHORECTOMY      1 ov removed    rotator cuff surgery Right     TUBAL LIGATION      bitateral       SOCIAL HISTORY:  Social History[1]    FAMILY HISTORY:  Family History   Problem Relation Name Age of Onset    Diabetes Mellitus Mother Desiree Mcgarry     Arthritis Mother Desiree Mcgarry     Thyroid cancer Father Rohan Mcgarry         daughter     Parkinsonism Father Rohan Mcgarry     Heart disease Father Rohan Mcgarry     Cancer Father Rohan Mcgarry     Early death Father Rohan Mcgarry     Breast  cancer Maternal Grandmother Roxanna HarperCotto     Arthritis Maternal Grandmother Roxanna HarperCotto     Cancer Maternal Grandmother Roxanna Cotto     Breast cancer Paternal Grandmother Cielo Mcgarry     Cancer Paternal Grandmother Cielo Mcgarry     Birth defects Daughter Granddaughter Meenakshi Perry     Cancer Daughter Naomie Duke     Early death Daughter Granddaughter- Meenakshi Perry     Mental illness Son Dayron Perry     Miscarriages / Stillbirths Son Alfonso Perry     Cancer Daughter Kayla Luceroardabrahan         Thyroid       ALLERGIES AND MEDICATIONS: updated and reviewed.  Review of patient's allergies indicates:   Allergen Reactions    Ciprofloxacin     Percocet  [oxycodone-acetaminophen]      Other reaction(s): Hives     Medication List with Changes/Refills   New Medications    SEMAGLUTIDE, WEIGHT LOSS, (WEGOVY) 0.5 MG/0.5 ML PNIJ    Inject 0.5 mg into the skin every 7 days.   Current Medications    GABAPENTIN (NEURONTIN) 300 MG CAPSULE    TAKE ONE CAPSULE BY MOUTH EVERY EVENING    METHYLPREDNISOLONE (MEDROL DOSEPACK) 4 MG TABLET    use as directed    SOD SULF-POT CHLORIDE-MAG SULF (SUTAB) 1.479-0.188- 0.225 GRAM TABLET    Take 12 tablets by mouth once daily. Please follow Endoscopy 's instructions. Please apply coupon code. Please apply coupon code. BIN: [id number], PCN: [id number], GROUP: [id number], MEMBER ID: [id number]   Changed and/or Refilled Medications    Modified Medication Previous Medication    LEVOTHYROXINE (SYNTHROID) 100 MCG TABLET levothyroxine (SYNTHROID) 100 MCG tablet       Take 1 tablet (100 mcg total) by mouth before breakfast.    Take 1 tablet (100 mcg total) by mouth before breakfast.   Discontinued Medications    TIRZEPATIDE, WEIGHT LOSS, (ZEPBOUND) 5 MG/0.5 ML PNIJ    Inject 5 mg into the skin every 7 days.          CARE TEAM:  Patient Care Team:  Steve Quintanilla MD as PCP - General (Internal Medicine)         PHYSICAL EXAM:   Vitals:     "07/07/25 1315   BP: (!) 88/60   Pulse: 69   Temp: 97.5 °F (36.4 °C)     Weight: 65.7 kg (144 lb 13.5 oz)   Height: 5' 5" (165.1 cm)   Body mass index is 24.1 kg/m².    Physical Exam    Vitals: Weight: 144 lbs. Low blood pressure.  General: No acute distress. Well-developed. Well-nourished.  Eyes: EOMI. Sclerae anicteric.  HENT: Normocephalic. Atraumatic. Nares patent. Moist oral mucosa.  Ears: Bilateral TMs clear. Bilateral EACs clear.  Cardiovascular: Regular rate. Regular rhythm. No murmurs. No rubs. No gallops. Normal S1, S2.  Respiratory: Normal respiratory effort. Clear to auscultation bilaterally. No rales. No rhonchi. No wheezing.  Abdomen: Soft. Non-tender. Non-distended. Normoactive bowel sounds.  Musculoskeletal: No  obvious deformity.  Extremities: No lower extremity edema.  Neurological: Alert & oriented x3. No slurred speech. Normal gait.  Psychiatric: Normal mood. Normal affect. Good insight. Good judgment.  Skin: Warm. Dry. No rash.  MSK: Hand/Wrist - Right: Middle knuckle swollen on right finger.             ASSESSMENT AND PLAN:  Sprain of interphalangeal joint of right little finger, initial encounter\  Finger pain, right  Fall, initial encounter  -     X-Ray Hand Complete Right; Future; Expected date: 07/07/2025  -     Ambulatory referral/consult to Hand Surgery; Future; Expected date: 07/14/2025  - Ordered XR Right Hand due to persistent pain and limited mobility 4 weeks post-fall.  - Mirta reports pain in the right little finger radiating up the arm, with swelling at the middle knuckle and difficulty bending and straightening the finger.    Class 1 obesity due to excess calories without serious comorbidity with body mass index (BMI) of 32.0 to 32.9 in adult  -     semaglutide, weight loss, (WEGOVY) 0.5 mg/0.5 mL PnIj; Inject 0.5 mg into the skin every 7 days.  Dispense: 2 mL; Refill: 5  - Documented current weight of 144 lbs, down from 154 lbs previously, with total weight loss from 185.63 lbs in " July 2024.  - Mirta currently on Zepbound 5 mg once weekly with no side effects reported.  - Due to insurance coverage changes, attempted to prescribe Wegovy 0.5 mg weekly injection as potential alternative (pending insurance approval).  - Discussed oral medication options (Qsymia, Contrave) as backup if injectables not covered.  - Reviewed potential side effects of Qsymia including anxiety, palpitations, electrolyte changes, and kidney stones, with proper discontinuation protocol to prevent withdrawal seizures.  - Discontinued Zepbound 5 mg weekly injection.  - Mirta to continue current Mediterranean-style diet focusing on protein and vegetables.  - Emphasized importance of resistance training 2 times per week for maintaining muscle mass during weight loss.  - Noted patient had nephrolithiasis during pregnancy 40 years ago.  - This history was considered when discussing potential side effects of Qsymia, which includes kidney stones.    Hypothyroidism, unspecified type  -     levothyroxine (SYNTHROID) 100 MCG tablet; Take 1 tablet (100 mcg total) by mouth before breakfast.  Dispense: 30 tablet; Refill: 11  - Noted minimally abnormal thyroid function.  - Continued levothyroxine 100 mcg daily, changed to brand name only.  - Instructed patient to take medication first thing in the morning, with no food or other medications within 30-60 minutes.  - Ordered TSH and free T4 non-fasting labs in August 2025 to recheck thyroid function, suspecting weight change may impact levels.    Healthcare maintenance  - Noted the patient's blood pressure is low and tends to run low at baseline.  - Mirta reports feeling fatigued today, possibly due dehydration.  - Mirta reports feeling dehydrated with insufficient water intake.  - Currently uses Liquid IV supplement with electrolytes to help with hydration.  - Advised to increase water intake to 65-70 ounces daily, especially in hot weather.  - Reviewed colonoscopy from May which showed  diverticulosis with no specimens collected.  - Recommend repeat colonoscopy in 10 years.      - Schedule follow-up 3 months to reassess weight management medication dosing and consider maintenance dosing options.       This note was generated with the assistance of ambient listening technology. Verbal consent was obtained by the patient and accompanying visitor(s) for the recording of patient appointment to facilitate this note. I attest to having reviewed and edited the generated note for accuracy, though some syntax or spelling errors may persist. Please contact the author of this note for any clarification.           [1]   Social History  Socioeconomic History    Marital status:      Spouse name: michael    Number of children: 4    Years of education: college   Occupational History    Occupation:      Employer: [employer]   Tobacco Use    Smoking status: Never    Smokeless tobacco: Never   Substance and Sexual Activity    Alcohol use: Never    Drug use: Never    Sexual activity: Yes     Partners: Male     Birth control/protection: See Surgical Hx, None   Social History Narrative    Adult Screenings updated and reviewed  6/5/14    Mammogram( for females) overdue     Pap ( for females)overdue    Colonoscopy age  49- not done yet     Flu shot yearly not done for 2013     Td ?    Pneumovax recommended one time  at age  64    Zostavax recommended one time at  age  59    Eye exam 2 years Bone density - not done          U/s thyroid  4/24/14     Social Drivers of Health     Financial Resource Strain: Low Risk  (3/25/2025)    Overall Financial Resource Strain (CARDIA)     Difficulty of Paying Living Expenses: Not hard at all   Food Insecurity: No Food Insecurity (3/25/2025)    Hunger Vital Sign     Worried About Running Out of Food in the Last Year: Never true     Ran Out of Food in the Last Year: Never true   Transportation Needs: No Transportation Needs (3/25/2025)    PRAPARE - Transportation     Lack  of Transportation (Medical): No     Lack of Transportation (Non-Medical): No   Physical Activity: Insufficiently Active (3/25/2025)    Exercise Vital Sign     Days of Exercise per Week: 2 days     Minutes of Exercise per Session: 60 min   Stress: No Stress Concern Present (3/25/2025)    [employer] [place] [employer] - Occupational Stress Questionnaire     Feeling of Stress : Not at all   Housing Stability: Low Risk  (3/25/2025)    Housing Stability Vital Sign     Unable to Pay for Housing in the Last Year: No     Number of Times Moved in the Last Year: 0     Homeless in the Last Year: No

## 2025-08-14 ENCOUNTER — HOSPITAL ENCOUNTER (OUTPATIENT)
Dept: RADIOLOGY | Facility: HOSPITAL | Age: 60
Discharge: HOME | End: 2025-08-14
Attending: INTERNAL MEDICINE
Payer: COMMERCIAL

## 2025-08-14 DIAGNOSIS — Z12.31 BREAST CANCER SCREENING BY MAMMOGRAM: ICD-10-CM

## 2025-08-14 PROCEDURE — 77067 SCR MAMMO BI INCL CAD: CPT | Mod: TC
